# Patient Record
Sex: FEMALE | Race: WHITE | NOT HISPANIC OR LATINO | Employment: OTHER | URBAN - METROPOLITAN AREA
[De-identification: names, ages, dates, MRNs, and addresses within clinical notes are randomized per-mention and may not be internally consistent; named-entity substitution may affect disease eponyms.]

---

## 2017-03-07 ENCOUNTER — HOSPITAL ENCOUNTER (OUTPATIENT)
Dept: ORTHOPEDIC SURGERY | Facility: CLINIC | Age: 67
Discharge: HOME | End: 2017-03-07
Attending: PODIATRIST | Admitting: PODIATRIST

## 2017-07-18 ENCOUNTER — HOSPITAL ENCOUNTER (OUTPATIENT)
Dept: ORTHOPEDIC SURGERY | Facility: CLINIC | Age: 67
Discharge: HOME | End: 2017-07-18
Attending: PODIATRIST | Admitting: PODIATRIST

## 2019-06-16 RX ORDER — SIMVASTATIN 40 MG
TABLET ORAL
Qty: 90 TABLET | Refills: 1 | Status: SHIPPED | OUTPATIENT
Start: 2019-06-16 | End: 2020-01-07

## 2019-07-11 LAB
25(OH)D3+25(OH)D2 SERPL-MCNC: 36.7 NG/ML (ref 30–100)
ALBUMIN SERPL-MCNC: 3.9 G/DL (ref 3.5–4.8)
ALBUMIN/GLOB SERPL: 1.6 {RATIO} (ref 1.2–2.2)
ALP SERPL-CCNC: 109 IU/L (ref 39–117)
ALT SERPL-CCNC: 13 IU/L (ref 0–32)
AMBIG ABBREV CMP14 DEFAULT: NORMAL
AMBIG ABBREV LP DEFAULT: NORMAL
AST SERPL-CCNC: 13 IU/L (ref 0–40)
BILIRUB SERPL-MCNC: 0.3 MG/DL (ref 0–1.2)
BUN SERPL-MCNC: 27 MG/DL (ref 8–27)
BUN/CREAT SERPL: 21 (ref 12–28)
CALCIUM SERPL-MCNC: 9.2 MG/DL (ref 8.7–10.3)
CHLORIDE SERPL-SCNC: 103 MMOL/L (ref 96–106)
CHOLEST SERPL-MCNC: 140 MG/DL (ref 100–199)
CO2 SERPL-SCNC: 24 MMOL/L (ref 20–29)
CREAT SERPL-MCNC: 1.31 MG/DL (ref 0.57–1)
GLOBULIN SER CALC-MCNC: 2.5 G/DL (ref 1.5–4.5)
GLUCOSE SERPL-MCNC: 185 MG/DL (ref 65–99)
HBA1C MFR BLD: 7.9 % (ref 4.8–5.6)
HDLC SERPL-MCNC: 34 MG/DL
LDLC SERPL CALC-MCNC: 80 MG/DL (ref 0–99)
POTASSIUM SERPL-SCNC: 5.1 MMOL/L (ref 3.5–5.2)
PROT SERPL-MCNC: 6.4 G/DL (ref 6–8.5)
SODIUM SERPL-SCNC: 140 MMOL/L (ref 134–144)
TRIGL SERPL-MCNC: 131 MG/DL (ref 0–149)
VLDLC SERPL CALC-MCNC: 26 MG/DL (ref 5–40)

## 2019-07-16 ENCOUNTER — OFFICE VISIT (OUTPATIENT)
Dept: FAMILY MEDICINE CLINIC | Facility: CLINIC | Age: 69
End: 2019-07-16

## 2019-07-16 VITALS
WEIGHT: 251 LBS | HEIGHT: 66 IN | BODY MASS INDEX: 40.34 KG/M2 | HEART RATE: 56 BPM | SYSTOLIC BLOOD PRESSURE: 152 MMHG | OXYGEN SATURATION: 98 % | TEMPERATURE: 98 F | DIASTOLIC BLOOD PRESSURE: 75 MMHG

## 2019-07-16 DIAGNOSIS — E78.2 MIXED HYPERLIPIDEMIA: ICD-10-CM

## 2019-07-16 DIAGNOSIS — I10 ESSENTIAL HYPERTENSION: Primary | ICD-10-CM

## 2019-07-16 DIAGNOSIS — E55.9 VITAMIN D DEFICIENCY: ICD-10-CM

## 2019-07-16 DIAGNOSIS — E11.29 TYPE 2 DIABETES MELLITUS WITH OTHER DIABETIC KIDNEY COMPLICATION (HCC): ICD-10-CM

## 2019-07-16 PROCEDURE — 99213 OFFICE O/P EST LOW 20 MIN: CPT | Performed by: FAMILY MEDICINE

## 2019-07-16 RX ORDER — LOSARTAN POTASSIUM 50 MG/1
25 TABLET ORAL DAILY
COMMUNITY
Start: 2018-07-17 | End: 2022-02-14

## 2019-07-16 RX ORDER — BUPROPION HYDROCHLORIDE 300 MG/1
1 TABLET ORAL EVERY 24 HOURS
COMMUNITY
Start: 2017-02-21 | End: 2019-09-21 | Stop reason: SDUPTHER

## 2019-07-16 RX ORDER — FUROSEMIDE 20 MG/1
1 TABLET ORAL DAILY
COMMUNITY
Start: 2015-04-28 | End: 2020-05-10 | Stop reason: SDUPTHER

## 2019-07-16 RX ORDER — ERGOCALCIFEROL 1.25 MG/1
1 CAPSULE ORAL
COMMUNITY
Start: 2017-09-23 | End: 2019-08-11 | Stop reason: SDUPTHER

## 2019-07-16 RX ORDER — INSULIN ASPART 100 [IU]/ML
INJECTION, SUSPENSION SUBCUTANEOUS
COMMUNITY
Start: 2019-04-10 | End: 2019-07-26 | Stop reason: SDUPTHER

## 2019-07-16 RX ORDER — NEBIVOLOL 10 MG/1
1 TABLET ORAL DAILY
COMMUNITY
Start: 2016-04-25 | End: 2019-08-11 | Stop reason: SDUPTHER

## 2019-07-16 RX ORDER — SIMVASTATIN 40 MG
1 TABLET ORAL EVERY 24 HOURS
COMMUNITY
Start: 2018-09-12 | End: 2020-10-04

## 2019-07-16 NOTE — PATIENT INSTRUCTIONS
Keep working to lose weight through healthy eating and exercise.  Be compliant with  Your diabetic diet

## 2019-07-16 NOTE — PROGRESS NOTES
Subjective   Tanna Bull is a 68 y.o. female.     Comes in for follow up on bp, chol, dm, vit D  Labs done prior to appt and reviewed with her  Noncompliant with  Diet  A1C has increased  Not checking bs   Sees Dr. Torres in [place]         The following portions of the patient's history were reviewed and updated as appropriate: allergies, current medications, past family history, past medical history, past social history, past surgical history and problem list.  Past Medical History:   Diagnosis Date   • Arthritis    • Depression     Mild    • DM2 (diabetes mellitus, type 2) (CMS/[facility])    • High cholesterol    • HTN (hypertension)    • IDDM (insulin dependent diabetes mellitus) (CMS/[facility])      Past Surgical History:   Procedure Laterality Date   • BUNIONECTOMY     • CATARACT EXTRACTION     • CHOLECYSTECTOMY     • ORIF ANKLE FRACTURE Left 2009     Family History   Problem Relation Age of Onset   • Diabetes Other    • Stroke Other    • Breast cancer Other      Social History     Socioeconomic History   • Marital status: Single     Spouse name: Not on file   • Number of children: Not on file   • Years of education: Not on file   • Highest education level: Not on file   Tobacco Use   • Smoking status: Never Smoker   Substance and Sexual Activity   • Alcohol use: Yes     Frequency: Never   • Drug use: No         Current Outpatient Medications:   •  buPROPion XL (WELLBUTRIN XL) 300 MG 24 hr tablet, Take 1 tablet by mouth Daily., Disp: , Rfl:   •  furosemide (LASIX) 20 MG tablet, Take 1 tablet by mouth Daily., Disp: , Rfl:   •  Glucosamine-Chondroit-Collagen ([employer] GLUCO-CHONDROIT PLUS UC-II) 125-100-10 MG tablet, [employer] GLUCO-CHONDROIT PLUS UC--100-10 MG TABS, Disp: , Rfl:   •  glucose blood (ONE TOUCH ULTRA TEST) test strip, ONETOUCH ULTRA BLUE STRP, Disp: , Rfl:   •  insulin aspart protamine & aspart (NOVOLOG) 70/30 100 unit/mL, NOVOLOG MIX 70/30 FLEXPEN (70-30) 100 UNIT/ML SUPN, Disp: , Rfl:   •  Insulin Pen Needle  "(COMFORT EZ PEN NEEDLES) 31G X 5 MM misc, COMFORT EZ PEN NEEDLES 31G X 5 MM, Disp: , Rfl:   •  losartan (COZAAR) 50 MG tablet, Take 1 tablet by mouth Daily., Disp: , Rfl:   •  nebivolol (BYSTOLIC) 10 MG tablet, Take 1 tablet by mouth Daily., Disp: , Rfl:   •  simvastatin (ZOCOR) 40 MG tablet, Take 1 tablet by mouth Daily., Disp: , Rfl:   •  vitamin D (ERGOCALCIFEROL) 68670 units capsule capsule, Take 1 capsule by mouth Every 7 (Seven) Days., Disp: , Rfl:   •  NOVOLOG MIX 70/30 FLEXPEN (70-30) 100 UNIT/ML suspension pen-injector injection, , Disp: , Rfl:   •  simvastatin (ZOCOR) 40 MG tablet, TAKE 1 TABLET DAILY, Disp: 90 tablet, Rfl: 1    Review of Systems   Constitutional: Negative for diaphoresis, fatigue, fever, unexpected weight gain and unexpected weight loss.   Respiratory: Negative for cough, chest tightness and shortness of breath.    Cardiovascular: Negative for chest pain, palpitations and leg swelling.   Gastrointestinal: Negative for nausea and vomiting.   Endocrine: Negative for cold intolerance, heat intolerance, polydipsia, polyphagia and polyuria.   Neurological: Negative for dizziness, syncope and headache.     /75 (BP Location: Right arm, Patient Position: Sitting, Cuff Size: Adult)   Pulse 56   Temp 98 °F (36.7 °C) (Oral)   Ht 167.6 cm (66\")   Wt 114 kg (251 lb)   SpO2 98%   BMI 40.51 kg/m²       Objective   Physical Exam   Constitutional: She appears well-developed and well-nourished. No distress. She is morbidly obese.  HENT:   Head: Normocephalic and atraumatic.   Neck: Neck supple. No JVD present. No thyromegaly present.   Cardiovascular: Normal rate, regular rhythm, normal heart sounds and intact distal pulses. Exam reveals no gallop and no friction rub.   No murmur heard.  Pulmonary/Chest: Effort normal and breath sounds normal. No respiratory distress. She has no wheezes. She has no rales.   Musculoskeletal: She exhibits no edema.   Lymphadenopathy:     She has no cervical " adenopathy.   Neurological: She is alert.   Skin: Skin is warm and dry.         Assessment/Plan   Problems Addressed this Visit        Cardiovascular and Mediastinum    Hyperlipidemia    Relevant Medications    simvastatin (ZOCOR) 40 MG tablet    Hypertension - Primary    Relevant Medications    furosemide (LASIX) 20 MG tablet    losartan (COZAAR) 50 MG tablet    nebivolol (BYSTOLIC) 10 MG tablet       Digestive    Vitamin D deficiency       Endocrine    Type 2 diabetes mellitus with other diabetic kidney complication (CMS/HCC)    Relevant Medications    furosemide (LASIX) 20 MG tablet    insulin aspart protamine & aspart (NOVOLOG) 70/30 100 unit/mL    NOVOLOG MIX 70/30 FLEXPEN (70-30) 100 UNIT/ML suspension pen-injector injection

## 2019-07-17 NOTE — PROGRESS NOTES
Subjective   Tanna Bull is a 68 y.o. female.     History of Present Illness     The following portions of the patient's history were reviewed and updated as appropriate: allergies, current medications, past family history, past medical history, past social history, past surgical history and problem list.  Past Medical History:   Diagnosis Date   • Arthritis    • Depression     Mild    • DM2 (diabetes mellitus, type 2) (CMS/[facility])    • High cholesterol    • HTN (hypertension)    • IDDM (insulin dependent diabetes mellitus) (CMS/[facility])      Past Surgical History:   Procedure Laterality Date   • BUNIONECTOMY     • CATARACT EXTRACTION     • CHOLECYSTECTOMY     • ORIF ANKLE FRACTURE Left 2009     Family History   Problem Relation Age of Onset   • Diabetes Other    • Stroke Other    • Breast cancer Other      Social History     Socioeconomic History   • Marital status: Single     Spouse name: Not on file   • Number of children: Not on file   • Years of education: Not on file   • Highest education level: Not on file   Tobacco Use   • Smoking status: Never Smoker   Substance and Sexual Activity   • Alcohol use: Yes     Frequency: Never   • Drug use: No         Current Outpatient Medications:   •  buPROPion XL (WELLBUTRIN XL) 300 MG 24 hr tablet, Take 1 tablet by mouth Daily., Disp: , Rfl:   •  furosemide (LASIX) 20 MG tablet, Take 1 tablet by mouth Daily., Disp: , Rfl:   •  Glucosamine-Chondroit-Collagen ([employer]S GLUCO-CHONDROIT PLUS UC-II) 125-100-10 MG tablet, [employer] GLUCO-CHONDROIT PLUS UC--100-10 MG TABS, Disp: , Rfl:   •  glucose blood (ONE TOUCH ULTRA TEST) test strip, ONETOUCH ULTRA BLUE STRP, Disp: , Rfl:   •  insulin aspart protamine & aspart (NOVOLOG) 70/30 100 unit/mL, NOVOLOG MIX 70/30 FLEXPEN (70-30) 100 UNIT/ML SUPN, Disp: , Rfl:   •  Insulin Pen Needle (COMFORT EZ PEN NEEDLES) 31G X 5 MM misc, COMFORT EZ PEN NEEDLES 31G X 5 MM, Disp: , Rfl:   •  losartan (COZAAR) 50 MG tablet, Take 1 tablet by mouth Daily.,  "Disp: , Rfl:   •  nebivolol (BYSTOLIC) 10 MG tablet, Take 1 tablet by mouth Daily., Disp: , Rfl:   •  simvastatin (ZOCOR) 40 MG tablet, Take 1 tablet by mouth Daily., Disp: , Rfl:   •  vitamin D (ERGOCALCIFEROL) 45913 units capsule capsule, Take 1 capsule by mouth Every 7 (Seven) Days., Disp: , Rfl:   •  NOVOLOG MIX 70/30 FLEXPEN (70-30) 100 UNIT/ML suspension pen-injector injection, , Disp: , Rfl:   •  simvastatin (ZOCOR) 40 MG tablet, TAKE 1 TABLET DAILY, Disp: 90 tablet, Rfl: 1    Review of Systems  /75 (BP Location: Right arm, Patient Position: Sitting, Cuff Size: Adult)   Pulse 56   Temp 98 °F (36.7 °C) (Oral)   Ht 167.6 cm (66\")   Wt 114 kg (251 lb)   SpO2 98%   BMI 40.51 kg/m²       Objective   Physical Exam      Assessment/Plan   Problems Addressed this Visit        Cardiovascular and Mediastinum    Hyperlipidemia    Relevant Medications    simvastatin (ZOCOR) 40 MG tablet    Hypertension - Primary    Relevant Medications    furosemide (LASIX) 20 MG tablet    losartan (COZAAR) 50 MG tablet    nebivolol (BYSTOLIC) 10 MG tablet       Digestive    Vitamin D deficiency       Endocrine    Type 2 diabetes mellitus with other diabetic kidney complication (CMS/HCC)    Relevant Medications    furosemide (LASIX) 20 MG tablet    insulin aspart protamine & aspart (NOVOLOG) 70/30 100 unit/mL    NOVOLOG MIX 70/30 FLEXPEN (70-30) 100 UNIT/ML suspension pen-injector injection                 "

## 2019-07-26 RX ORDER — INSULIN ASPART 100 [IU]/ML
INJECTION, SUSPENSION SUBCUTANEOUS
Qty: 90 ML | Refills: 3 | Status: SHIPPED | OUTPATIENT
Start: 2019-07-26 | End: 2020-07-12

## 2019-08-12 RX ORDER — NEBIVOLOL HYDROCHLORIDE 10 MG/1
TABLET ORAL
Qty: 90 TABLET | Refills: 3 | Status: SHIPPED | OUTPATIENT
Start: 2019-08-12 | End: 2020-08-24

## 2019-08-12 RX ORDER — ERGOCALCIFEROL 1.25 MG/1
CAPSULE ORAL
Qty: 12 CAPSULE | Refills: 3 | Status: SHIPPED | OUTPATIENT
Start: 2019-08-12 | End: 2020-07-13

## 2019-09-21 RX ORDER — BUPROPION HYDROCHLORIDE 300 MG/1
TABLET ORAL
Qty: 90 TABLET | Refills: 1 | Status: SHIPPED | OUTPATIENT
Start: 2019-09-21 | End: 2020-04-20

## 2020-01-03 LAB
25(OH)D3+25(OH)D2 SERPL-MCNC: 43.7 NG/ML (ref 30–100)
ALBUMIN SERPL-MCNC: 4.1 G/DL (ref 3.5–4.8)
ALBUMIN/GLOB SERPL: 1.7 {RATIO} (ref 1.2–2.2)
ALP SERPL-CCNC: 110 IU/L (ref 39–117)
ALT SERPL-CCNC: 15 IU/L (ref 0–32)
AMBIG ABBREV CMP14 DEFAULT: NORMAL
AMBIG ABBREV LP DEFAULT: NORMAL
AST SERPL-CCNC: 14 IU/L (ref 0–40)
BILIRUB SERPL-MCNC: 0.4 MG/DL (ref 0–1.2)
BUN SERPL-MCNC: 27 MG/DL (ref 8–27)
BUN/CREAT SERPL: 18 (ref 12–28)
CALCIUM SERPL-MCNC: 9.4 MG/DL (ref 8.7–10.3)
CHLORIDE SERPL-SCNC: 100 MMOL/L (ref 96–106)
CHOLEST SERPL-MCNC: 166 MG/DL (ref 100–199)
CO2 SERPL-SCNC: 25 MMOL/L (ref 20–29)
CREAT SERPL-MCNC: 1.5 MG/DL (ref 0.57–1)
GLOBULIN SER CALC-MCNC: 2.4 G/DL (ref 1.5–4.5)
GLUCOSE SERPL-MCNC: 194 MG/DL (ref 65–99)
HBA1C MFR BLD: 6.9 % (ref 4.8–5.6)
HDLC SERPL-MCNC: 35 MG/DL
LDLC SERPL CALC-MCNC: 103 MG/DL (ref 0–99)
POTASSIUM SERPL-SCNC: 5.3 MMOL/L (ref 3.5–5.2)
PROT SERPL-MCNC: 6.5 G/DL (ref 6–8.5)
SODIUM SERPL-SCNC: 140 MMOL/L (ref 134–144)
TRIGL SERPL-MCNC: 141 MG/DL (ref 0–149)
VLDLC SERPL CALC-MCNC: 28 MG/DL (ref 5–40)

## 2020-01-07 RX ORDER — SIMVASTATIN 40 MG
TABLET ORAL
Qty: 90 TABLET | Refills: 1 | Status: SHIPPED | OUTPATIENT
Start: 2020-01-07 | End: 2020-07-12

## 2020-01-16 ENCOUNTER — OFFICE VISIT (OUTPATIENT)
Dept: FAMILY MEDICINE CLINIC | Facility: CLINIC | Age: 70
End: 2020-01-16

## 2020-01-16 VITALS
BODY MASS INDEX: 40.34 KG/M2 | OXYGEN SATURATION: 96 % | DIASTOLIC BLOOD PRESSURE: 66 MMHG | SYSTOLIC BLOOD PRESSURE: 146 MMHG | HEIGHT: 66 IN | WEIGHT: 251 LBS | HEART RATE: 57 BPM

## 2020-01-16 DIAGNOSIS — E55.9 VITAMIN D DEFICIENCY: ICD-10-CM

## 2020-01-16 DIAGNOSIS — I10 ESSENTIAL HYPERTENSION: Primary | ICD-10-CM

## 2020-01-16 DIAGNOSIS — E11.29 TYPE 2 DIABETES MELLITUS WITH OTHER DIABETIC KIDNEY COMPLICATION (HCC): ICD-10-CM

## 2020-01-16 DIAGNOSIS — E78.2 MIXED HYPERLIPIDEMIA: ICD-10-CM

## 2020-01-16 DIAGNOSIS — Z12.31 BREAST CANCER SCREENING BY MAMMOGRAM: ICD-10-CM

## 2020-01-16 DIAGNOSIS — Z12.11 SCREENING FOR COLON CANCER: ICD-10-CM

## 2020-01-16 PROCEDURE — 99213 OFFICE O/P EST LOW 20 MIN: CPT | Performed by: FAMILY MEDICINE

## 2020-01-16 NOTE — PROGRESS NOTES
Subjective   Tanna Bull is a 69 y.o. female.     She comes in today for follow-up on her blood pressure, cholesterol, type 2 diabetes  Labs were done prior to her appointment  These were reviewed with her  She has had her flu shot  Exercising 3 times a week  BS not checking consistently  Last eye exam - last Feb  Due colonoscopy  Not need refills  She is due mammogram       The following portions of the patient's history were reviewed and updated as appropriate: allergies, current medications, past family history, past medical history, past social history, past surgical history and problem list.  Past Medical History:   Diagnosis Date   • Arthritis    • Depression     Mild    • DM2 (diabetes mellitus, type 2) (CMS/[facility])    • High cholesterol    • HTN (hypertension)    • IDDM (insulin dependent diabetes mellitus) (CMS/[facility])      Past Surgical History:   Procedure Laterality Date   • BUNIONECTOMY     • CATARACT EXTRACTION     • CHOLECYSTECTOMY     • ORIF ANKLE FRACTURE Left 2009     Family History   Problem Relation Age of Onset   • Diabetes Other    • Stroke Other    • Breast cancer Other      Social History     Socioeconomic History   • Marital status: Single     Spouse name: Not on file   • Number of children: Not on file   • Years of education: Not on file   • Highest education level: Not on file   Tobacco Use   • Smoking status: Never Smoker   Substance and Sexual Activity   • Alcohol use: Yes     Frequency: Never   • Drug use: No         Current Outpatient Medications:   •  buPROPion XL (WELLBUTRIN XL) 300 MG 24 hr tablet, TAKE 1 TABLET DAILY, Disp: 90 tablet, Rfl: 1  •  BYSTOLIC 10 MG tablet, TAKE 1 TABLET DAILY, Disp: 90 tablet, Rfl: 3  •  furosemide (LASIX) 20 MG tablet, Take 1 tablet by mouth Daily., Disp: , Rfl:   •  Glucosamine-Chondroit-Collagen ([employer]S GLUCO-CHONDROIT PLUS UC-II) 125-100-10 MG tablet, CVS GLUCO-CHONDROIT PLUS UC--100-10 MG TABS, Disp: , Rfl:   •  glucose blood (ONE TOUCH ULTRA  "TEST) test strip, ONETOUCH ULTRA BLUE STRP, Disp: , Rfl:   •  Insulin Pen Needle (COMFORT EZ PEN NEEDLES) 31G X 5 MM misc, COMFORT EZ PEN NEEDLES 31G X 5 MM, Disp: , Rfl:   •  losartan (COZAAR) 50 MG tablet, Take 1 tablet by mouth Daily., Disp: , Rfl:   •  NOVOLOG MIX 70/30 FLEXPEN (70-30) 100 UNIT/ML suspension pen-injector injection, INJECT 45 UNITS            SUBCUTANEOUSLY TWO TIMES A DAY, Disp: 90 mL, Rfl: 3  •  simvastatin (ZOCOR) 40 MG tablet, Take 1 tablet by mouth Daily., Disp: , Rfl:   •  simvastatin (ZOCOR) 40 MG tablet, TAKE 1 TABLET DAILY, Disp: 90 tablet, Rfl: 1  •  vitamin D (ERGOCALCIFEROL) 90954 units capsule capsule, TAKE 1 CAPSULE WEEKLY, Disp: 12 capsule, Rfl: 3    Review of Systems   Constitutional: Negative for diaphoresis, fatigue, fever, unexpected weight gain and unexpected weight loss.   Respiratory: Negative for cough, chest tightness and shortness of breath.    Cardiovascular: Negative for chest pain, palpitations and leg swelling.   Gastrointestinal: Negative for nausea and vomiting.   Endocrine: Negative.    Neurological: Negative for dizziness, syncope and headache.     /66 (BP Location: Left arm, Patient Position: Sitting, Cuff Size: Large Adult)   Pulse 57   Ht 167.6 cm (66\")   Wt 114 kg (251 lb)   SpO2 96%   BMI 40.51 kg/m²       Objective   Physical Exam   Constitutional: She appears well-developed and well-nourished. No distress. She is morbidly obese.  HENT:   Head: Normocephalic and atraumatic.   Neck: Neck supple. No JVD present. No thyromegaly present.   Cardiovascular: Normal rate, regular rhythm, normal heart sounds and intact distal pulses. Exam reveals no gallop and no friction rub.   No murmur heard.  Pulmonary/Chest: Effort normal and breath sounds normal. No respiratory distress. She has no wheezes. She has no rales.   Musculoskeletal: She exhibits no edema.   Lymphadenopathy:     She has no cervical adenopathy.   Neurological: She is alert.   Skin: Skin is " warm and dry.   Psychiatric: She has a normal mood and affect.   Nursing note and vitals reviewed.        Assessment/Plan   Problems Addressed this Visit        Cardiovascular and Mediastinum    Hyperlipidemia    Hypertension - Primary       Digestive    Vitamin D deficiency       Endocrine    Type 2 diabetes mellitus with other diabetic kidney complication (CMS/HCC)      Other Visit Diagnoses     Screening for colon cancer        Relevant Orders    Ambulatory Referral For Screening Colonoscopy    Breast cancer screening by mammogram        Relevant Orders    Mammo Screening Digital Tomosynthesis Bilateral With CAD          Patient's labs were done prior to her appointment and I reviewed all these with her  Her good cholesterol remains low and she was encouraged to continue working with exercise  There is been improvement in her hemoglobin A1c  She is overdue for an eye exam and she was encouraged to get that scheduled  Counseled on the need for weight loss and dietary compliance  Blood pressure stable  She is due for her colonoscopy and her mammogram and she will get those scheduled  I will see her back in 6 months for her follow-up and her Medicare wellness

## 2020-01-16 NOTE — PATIENT INSTRUCTIONS
See your eye doctor this  Year  Keep working to lose weight through healthy eating and exercise.  No fried foods and limit pasta, bread, and sweets.

## 2020-01-22 ENCOUNTER — HOSPITAL ENCOUNTER (OUTPATIENT)
Facility: HOSPITAL | Age: 70
Setting detail: HOSPITAL OUTPATIENT SURGERY
End: 2020-01-22
Attending: INTERNAL MEDICINE | Admitting: INTERNAL MEDICINE

## 2020-01-28 ENCOUNTER — HOSPITAL ENCOUNTER (OUTPATIENT)
Dept: MAMMOGRAPHY | Facility: HOSPITAL | Age: 70
Discharge: HOME | End: 2020-01-28
Admitting: FAMILY MEDICINE

## 2020-01-28 DIAGNOSIS — Z12.31 BREAST CANCER SCREENING BY MAMMOGRAM: ICD-10-CM

## 2020-01-28 PROCEDURE — 77067 SCR MAMMO BI INCL CAD: CPT

## 2020-01-28 PROCEDURE — 77063 BREAST TOMOSYNTHESIS BI: CPT

## 2020-02-20 ENCOUNTER — APPOINTMENT (OUTPATIENT)
Dept: GENERAL RADIOLOGY | Facility: HOSPITAL | Age: 70
End: 2020-02-20

## 2020-02-20 ENCOUNTER — HOSPITAL ENCOUNTER (EMERGENCY)
Facility: HOSPITAL | Age: 70
Discharge: HOME | End: 2020-02-20
Admitting: EMERGENCY MEDICINE

## 2020-02-20 VITALS
RESPIRATION RATE: 16 BRPM | OXYGEN SATURATION: 99 % | SYSTOLIC BLOOD PRESSURE: 157 MMHG | DIASTOLIC BLOOD PRESSURE: 84 MMHG | BODY MASS INDEX: 40.99 KG/M2 | HEIGHT: 66 IN | TEMPERATURE: 98.4 F | WEIGHT: 255.07 LBS | HEART RATE: 52 BPM

## 2020-02-20 DIAGNOSIS — M79.671 RIGHT FOOT PAIN: Primary | ICD-10-CM

## 2020-02-20 PROCEDURE — 73610 X-RAY EXAM OF ANKLE: CPT

## 2020-02-20 PROCEDURE — 73630 X-RAY EXAM OF FOOT: CPT

## 2020-02-20 PROCEDURE — 99283 EMERGENCY DEPT VISIT LOW MDM: CPT

## 2020-02-20 NOTE — ED PROVIDER NOTES
Subjective   Chief complaint: Foot pain      Context: Patient is a 69-year-old female who comes in [place] by private vehicle with her family member with complaints of right foot pain that started last night around 1030 and again around 4 when she stood up to go to the bathroom.  She denies any injury.  Has a history of osteoarthritis.  Has any fever or systemic illness    Duration: Yesterday    Timing: Waxes and wanes    Severity: Improving    Associated symptoms: Worse with initial weightbearing          PCP: dyana gaona      LNMP: Postmenopausal          Review of Systems   Constitutional: Negative.    HENT: Negative.    Respiratory: Negative.    Cardiovascular: Negative.    Gastrointestinal: Negative.    Musculoskeletal: Positive for myalgias.   Skin: Negative.    Neurological: Negative.    Hematological: Does not bruise/bleed easily.       Past Medical History:   Diagnosis Date   • Arthritis    • Depression     Mild    • DM2 (diabetes mellitus, type 2) (CMS/[facility])    • High cholesterol    • HTN (hypertension)    • IDDM (insulin dependent diabetes mellitus) (CMS/[facility])        Allergies   Allergen Reactions   • Ace Inhibitors Unknown (See Comments)   • Penicillin G Unknown (See Comments)       Past Surgical History:   Procedure Laterality Date   • BREAST BIOPSY Left     core bx (unsure when)   • BUNIONECTOMY     • CATARACT EXTRACTION     • CHOLECYSTECTOMY     • ORIF ANKLE FRACTURE Left 2009       Family History   Problem Relation Age of Onset   • Diabetes Other    • Stroke Other    • Breast cancer Other    • Breast cancer Sister 60       Social History     Socioeconomic History   • Marital status: Single     Spouse name: Not on file   • Number of children: Not on file   • Years of education: Not on file   • Highest education level: Not on file   Tobacco Use   • Smoking status: Never Smoker   Substance and Sexual Activity   • Alcohol use: Yes     Frequency: Never   • Drug use: No           Objective    Physical Exam     Vital signs and traige nurse note reviewed.  Constitutional:  Awake, alert; well developed and well nourished.  No acute distress, the patient is examined in hospital gown.  HEENT:  Normocephalic, atraumatic;  with intact EOM; oropharynx is pink and moist without edema or erythema.  Neck: Supple, full range of motion without pain;   Cardiovascular: Regular rate and rhythm,    Pulmonary: Respiratory effort regular, nonlabored;   Musculoskeletal: Full range of motion of the right foot.  There is some pain over the dorsum of proximal foot to palpation.  Minor mild tenderness over the plantar fascia.  There is no swelling erythema ecchymosis abrasions or recent trauma.  Distant resting sensorimotor intact.  Neuro: Alert oriented x3, speech is clear and appropriate.      Procedures           ED Course      Labs Reviewed - No data to display  Medications - No data to display  Xr Ankle 3+ View Right    Result Date: 2/20/2020  No acute osseous abnormality. Mild lateral soft tissue swelling is present. Irregularity seen along the malleoli likely related to previous ligamentous injury, similar as compared to the previous study.  Electronically Signed By-Cece Holman On:2/20/2020 10:43 AM This report was finalized on [id number] by  Cece Holman, .    Xr Foot 3+ View Right    Result Date: 2/20/2020  No acute osseous abnormality. Mild to moderate osteoarthritic changes are present with evidence of chronic enter fasciitis. No evidence of an inflammatory arthropathy.  Electronically Signed ByRenee Holman On:2/20/2020 10:45 AM This report was finalized on [id number] by  Cece Holman, .                                         MDM  Number of Diagnoses or Management Options  Right foot pain:   Diagnosis management comments: Medical decision  Comorbidities:  has a past medical history of Arthritis, Depression, DM2 (diabetes mellitus, type 2) (CMS/HCC), High cholesterol, HTN (hypertension), and IDDM (insulin  dependent diabetes mellitus) (CMS/[facility]).  Differentials: Stress fracture osteoarthritis plantar fascitis not all inclusive of differentials considered  Radiology interpretation:  X-rays reviewed by me and interpreted by radiologist,   Xr Ankle 3+ View Right    Result Date: 2/20/2020  No acute osseous abnormality. Mild lateral soft tissue swelling is present. Irregularity seen along the malleoli likely related to previous ligamentous injury, similar as compared to the previous study.  Electronically Signed By-Cece Holman On:2/20/2020 10:43 AM This report was finalized on [id number] by  Cece Holman, .    Xr Foot 3+ View Right    Result Date: 2/20/2020  No acute osseous abnormality. Mild to moderate osteoarthritic changes are present with evidence of chronic enter fasciitis. No evidence of an inflammatory arthropathy.  Electronically Signed ByRenee Holman On:2/20/2020 10:45 AM This report was finalized on [id number] by  Cece Holman, .    Patient was placed in a postop shoe  i discussed findings with patient who voices understanding of discharge instructions, signs and symptoms requiring return to ED; discharged improved and in stable condition with follow up for re-evaluation.        Final diagnoses:   Right foot pain            Mendy Velez, APRN  02/20/20 1103

## 2020-04-20 RX ORDER — BUPROPION HYDROCHLORIDE 300 MG/1
TABLET ORAL
Qty: 90 TABLET | Refills: 1 | Status: SHIPPED | OUTPATIENT
Start: 2020-04-20 | End: 2020-10-04

## 2020-05-10 RX ORDER — FUROSEMIDE 20 MG/1
20 TABLET ORAL DAILY
Qty: 90 TABLET | Refills: 0 | Status: SHIPPED | OUTPATIENT
Start: 2020-05-10 | End: 2020-12-26

## 2020-06-30 ENCOUNTER — TELEPHONE (OUTPATIENT)
Dept: FAMILY MEDICINE CLINIC | Facility: CLINIC | Age: 70
End: 2020-06-30

## 2020-06-30 DIAGNOSIS — E11.29 TYPE 2 DIABETES MELLITUS WITH OTHER DIABETIC KIDNEY COMPLICATION (HCC): ICD-10-CM

## 2020-06-30 DIAGNOSIS — E78.2 MIXED HYPERLIPIDEMIA: Primary | ICD-10-CM

## 2020-06-30 DIAGNOSIS — Z11.59 NEED FOR HEPATITIS C SCREENING TEST: ICD-10-CM

## 2020-06-30 DIAGNOSIS — I10 ESSENTIAL HYPERTENSION: ICD-10-CM

## 2020-06-30 DIAGNOSIS — E55.9 VITAMIN D DEFICIENCY: ICD-10-CM

## 2020-07-08 LAB
25(OH)D3+25(OH)D2 SERPL-MCNC: 45.2 NG/ML (ref 30–100)
ALBUMIN SERPL-MCNC: 4 G/DL (ref 3.7–4.7)
ALBUMIN/GLOB SERPL: 1.5 {RATIO} (ref 1.2–2.2)
ALP SERPL-CCNC: 114 IU/L (ref 39–117)
ALT SERPL-CCNC: 14 IU/L (ref 0–32)
AMBIG ABBREV CMP14 DEFAULT: NORMAL
AMBIG ABBREV LP DEFAULT: NORMAL
AST SERPL-CCNC: 16 IU/L (ref 0–40)
BILIRUB SERPL-MCNC: 0.3 MG/DL (ref 0–1.2)
BUN SERPL-MCNC: 24 MG/DL (ref 8–27)
BUN/CREAT SERPL: 18 (ref 12–28)
CALCIUM SERPL-MCNC: 9.3 MG/DL (ref 8.7–10.3)
CHLORIDE SERPL-SCNC: 102 MMOL/L (ref 96–106)
CHOLEST SERPL-MCNC: 149 MG/DL (ref 100–199)
CO2 SERPL-SCNC: 22 MMOL/L (ref 20–29)
CREAT SERPL-MCNC: 1.35 MG/DL (ref 0.57–1)
GLOBULIN SER CALC-MCNC: 2.7 G/DL (ref 1.5–4.5)
GLUCOSE SERPL-MCNC: 165 MG/DL (ref 65–99)
HBA1C MFR BLD: 7.6 % (ref 4.8–5.6)
HCV AB S/CO SERPL IA: <0.1 S/CO RATIO (ref 0–0.9)
HDLC SERPL-MCNC: 38 MG/DL
LDLC SERPL CALC-MCNC: 73 MG/DL (ref 0–99)
POTASSIUM SERPL-SCNC: 4.8 MMOL/L (ref 3.5–5.2)
PROT SERPL-MCNC: 6.7 G/DL (ref 6–8.5)
SODIUM SERPL-SCNC: 141 MMOL/L (ref 134–144)
TRIGL SERPL-MCNC: 189 MG/DL (ref 0–149)
VLDLC SERPL CALC-MCNC: 38 MG/DL (ref 5–40)

## 2020-07-12 RX ORDER — INSULIN ASPART 100 [IU]/ML
INJECTION, SUSPENSION SUBCUTANEOUS
Qty: 90 ML | Refills: 0 | Status: SHIPPED | OUTPATIENT
Start: 2020-07-12 | End: 2020-10-24

## 2020-07-12 RX ORDER — SIMVASTATIN 40 MG
TABLET ORAL
Qty: 90 TABLET | Refills: 0 | Status: SHIPPED | OUTPATIENT
Start: 2020-07-12 | End: 2020-07-21

## 2020-07-13 RX ORDER — ERGOCALCIFEROL 1.25 MG/1
CAPSULE ORAL
Qty: 12 CAPSULE | Refills: 3 | Status: SHIPPED | OUTPATIENT
Start: 2020-07-13 | End: 2021-07-04

## 2020-07-21 ENCOUNTER — LAB (OUTPATIENT)
Dept: FAMILY MEDICINE CLINIC | Facility: CLINIC | Age: 70
End: 2020-07-21

## 2020-07-21 ENCOUNTER — OFFICE VISIT (OUTPATIENT)
Dept: FAMILY MEDICINE CLINIC | Facility: CLINIC | Age: 70
End: 2020-07-21

## 2020-07-21 VITALS
HEART RATE: 52 BPM | BODY MASS INDEX: 40.66 KG/M2 | TEMPERATURE: 97.1 F | DIASTOLIC BLOOD PRESSURE: 75 MMHG | OXYGEN SATURATION: 97 % | HEIGHT: 66 IN | WEIGHT: 253 LBS | SYSTOLIC BLOOD PRESSURE: 144 MMHG

## 2020-07-21 DIAGNOSIS — L98.9 SKIN LESION OF FACE: ICD-10-CM

## 2020-07-21 DIAGNOSIS — E78.2 MIXED HYPERLIPIDEMIA: ICD-10-CM

## 2020-07-21 DIAGNOSIS — E11.29 TYPE 2 DIABETES MELLITUS WITH OTHER DIABETIC KIDNEY COMPLICATION (HCC): Primary | ICD-10-CM

## 2020-07-21 DIAGNOSIS — I10 ESSENTIAL HYPERTENSION: ICD-10-CM

## 2020-07-21 DIAGNOSIS — E55.9 VITAMIN D DEFICIENCY: ICD-10-CM

## 2020-07-21 PROCEDURE — 99213 OFFICE O/P EST LOW 20 MIN: CPT | Performed by: FAMILY MEDICINE

## 2020-07-21 NOTE — PATIENT INSTRUCTIONS
Keep working to lose weight through healthy eating and exercise.  Get a flu shot this Fall  No fried foods and limit pasta, bread, and sweets.

## 2020-07-21 NOTE — PROGRESS NOTES
Subjective   Tanna Bull is a 69 y.o. female.     She comes in for follow up on bp,chol, and dm  BS have increased a little and A1C has risen  Wearing a mask but going out into the community despite current pandemic  Feels well   Does not need refillsl         The following portions of the patient's history were reviewed and updated as appropriate: allergies, current medications, past family history, past medical history, past social history, past surgical history and problem list.  Past Medical History:   Diagnosis Date   • Arthritis    • Depression     Mild    • DM2 (diabetes mellitus, type 2) (CMS/[facility])    • High cholesterol    • HTN (hypertension)    • IDDM (insulin dependent diabetes mellitus)      Past Surgical History:   Procedure Laterality Date   • BREAST BIOPSY Left     core bx (unsure when)   • BUNIONECTOMY     • CATARACT EXTRACTION     • CHOLECYSTECTOMY     • ORIF ANKLE FRACTURE Left 2009     Family History   Problem Relation Age of Onset   • Diabetes Other    • Stroke Other    • Breast cancer Other    • Breast cancer Sister 60     Social History     Socioeconomic History   • Marital status: Single     Spouse name: Not on file   • Number of children: Not on file   • Years of education: Not on file   • Highest education level: Not on file   Tobacco Use   • Smoking status: Never Smoker   • Smokeless tobacco: Never Used   Substance and Sexual Activity   • Alcohol use: Yes     Frequency: Never   • Drug use: No         Current Outpatient Medications:   •  buPROPion XL (WELLBUTRIN XL) 300 MG 24 hr tablet, TAKE 1 TABLET DAILY, Disp: 90 tablet, Rfl: 1  •  BYSTOLIC 10 MG tablet, TAKE 1 TABLET DAILY, Disp: 90 tablet, Rfl: 3  •  furosemide (LASIX) 20 MG tablet, Take 1 tablet by mouth Daily., Disp: 90 tablet, Rfl: 0  •  Glucosamine-Chondroit-Collagen ([employer] GLUCO-CHONDROIT PLUS UC-II) 125-100-10 MG tablet, [employer] GLUCO-CHONDROIT PLUS UC--100-10 MG TABS, Disp: , Rfl:   •  glucose blood (ONE TOUCH ULTRA TEST) test  "strip, ONETOUCH ULTRA BLUE STRP, Disp: , Rfl:   •  Insulin Pen Needle (COMFORT EZ PEN NEEDLES) 31G X 5 MM misc, COMFORT EZ PEN NEEDLES 31G X 5 MM, Disp: , Rfl:   •  losartan (COZAAR) 50 MG tablet, Take 1 tablet by mouth Daily., Disp: , Rfl:   •  NOVOLOG MIX 70/30 FLEXPEN (70-30) 100 UNIT/ML suspension pen-injector injection, INJECT 45 UNITS            SUBCUTANEOUSLY TWO TIMES A DAY (Patient taking differently: Inject 50 Units under the skin into the appropriate area as directed 2 (two) times a day.), Disp: 90 mL, Rfl: 0  •  simvastatin (ZOCOR) 40 MG tablet, Take 1 tablet by mouth Daily., Disp: , Rfl:   •  vitamin D (ERGOCALCIFEROL) 1.25 MG (13634 UT) capsule capsule, TAKE 1 CAPSULE WEEKLY, Disp: 12 capsule, Rfl: 3    Review of Systems   Constitutional: Negative for diaphoresis, fatigue, fever, unexpected weight gain and unexpected weight loss.   Respiratory: Negative for cough, chest tightness and shortness of breath.    Cardiovascular: Negative for chest pain, palpitations and leg swelling.   Gastrointestinal: Negative for nausea and vomiting.   Endocrine: Negative.    Neurological: Negative for dizziness, syncope, numbness and headache.     /75 (BP Location: Left arm, Patient Position: Sitting, Cuff Size: Large Adult)   Pulse 52   Temp 97.1 °F (36.2 °C) (Temporal)   Ht 167.6 cm (66\")   Wt 115 kg (253 lb)   SpO2 97%   Breastfeeding No   BMI 40.84 kg/m²       Objective   Physical Exam   Constitutional: She appears well-developed and well-nourished. No distress. She is morbidly obese.  HENT:   Head: Normocephalic and atraumatic.   Neck: Neck supple. No JVD present. No thyromegaly present.   Cardiovascular: Normal rate, regular rhythm, normal heart sounds and intact distal pulses. Exam reveals no gallop and no friction rub.   No murmur heard.  Pulmonary/Chest: Effort normal and breath sounds normal. No respiratory distress. She has no wheezes. She has no rales.   Musculoskeletal: She exhibits no edema.    " Tanna had a diabetic foot exam performed today.   During the foot exam she had a monofilament test not performed.  Vascular Status -  Her right foot exhibits normal foot vasculature  and no edema. Her left foot exhibits normal foot vasculature  and no edema.  Skin Integrity  -  Her right foot skin is intact. She has right foot onychomycosis and callous right foot.  She has no right foot ulcer, no ingrown toenail on right foot, right heel is not dry and cracked, no right foot warmth, no right foot blister and no right foot gangrenous changes.Her left foot skin is intact.She has left foot onychomycosis and callous left foot. She has no left foot ulcer, no left ingrown toenail, no left heel dry and cracked, no left foot warmth, no left foot blister and no left foot gangrenous changes..   Foot Structure and Biomechanics -  Her right foot has no hallux valgus, no pes cavus, no claw toes and no hammer toes present. Her left foot has no hallux valgus, no pes cavus, no claw toes and no hammer toes.  Lymphadenopathy:     She has no cervical adenopathy.   Neurological: She is alert.   Skin: Skin is warm and dry. Turgor is normal.   Pearly raised lesion on left side of face ant to ear  1.5cm in size; irritated central appearance    Psychiatric: She has a normal mood and affect.   Nursing note and vitals reviewed.    Lab Results   Component Value Date    BUN 24 07/07/2020    CREATININE 1.35 (H) 07/07/2020    EGFRIFNONA 40 (L) 07/07/2020    EGFRIFAFRI 46 (L) 07/07/2020    BCR 18 07/07/2020    K 4.8 07/07/2020    CO2 22 07/07/2020    CALCIUM 9.3 07/07/2020    PROTENTOTREF 6.7 07/07/2020    ALBUMIN 4.0 07/07/2020    LABIL2 1.5 07/07/2020    AST 16 07/07/2020    ALT 14 07/07/2020     Lab Results   Component Value Date    CHLPL 149 07/07/2020    TRIG 189 (H) 07/07/2020    HDL 38 (L) 07/07/2020    LDL 73 07/07/2020     Lab Results   Component Value Date    HGBA1C 7.6 (H) 07/07/2020         Assessment/Plan   Problems Addressed this  Visit        Cardiovascular and Mediastinum    Hyperlipidemia    Relevant Orders    Comprehensive Metabolic Panel    Lipid Panel    Hypertension    Relevant Orders    Comprehensive Metabolic Panel    Lipid Panel       Digestive    Vitamin D deficiency    Relevant Orders    Vitamin D 25 Hydroxy       Endocrine    Type 2 diabetes mellitus with other diabetic kidney complication (CMS/HCC) - Primary    Relevant Orders    Comprehensive Metabolic Panel    Hemoglobin A1c    Lipid Panel      Other Visit Diagnoses     Skin lesion of face        Relevant Orders    Ambulatory Referral to Dermatology        counseled on the need for weight loss and dietary compliance  Labs reviewed  She will get her flu shot this fall  She had a pretty significant reaction to her Prevnar 13 and is resistant to getting a Pneumovax  Will refer to dermatology for evaluation of skin lesion on her face as I am concerned that it is a basal cell carcinoma  I will see her back in 6 months

## 2020-08-24 ENCOUNTER — TELEPHONE (OUTPATIENT)
Dept: FAMILY MEDICINE CLINIC | Facility: CLINIC | Age: 70
End: 2020-08-24

## 2020-08-24 RX ORDER — NEBIVOLOL HYDROCHLORIDE 10 MG/1
TABLET ORAL
Qty: 90 TABLET | Refills: 3 | Status: SHIPPED | OUTPATIENT
Start: 2020-08-24 | End: 2021-08-15

## 2020-08-24 NOTE — TELEPHONE ENCOUNTER
Pt is to have a colonoscopy on 9/1/20.  She is asking what she should do with her insulin on the day that she can only drink have liquids?

## 2020-08-29 ENCOUNTER — LAB (OUTPATIENT)
Dept: LAB | Facility: HOSPITAL | Age: 70
End: 2020-08-29

## 2020-08-29 PROCEDURE — U0002 COVID-19 LAB TEST NON-CDC: HCPCS

## 2020-08-29 PROCEDURE — U0004 COV-19 TEST NON-CDC HGH THRU: HCPCS

## 2020-08-29 PROCEDURE — C9803 HOPD COVID-19 SPEC COLLECT: HCPCS

## 2020-08-31 ENCOUNTER — ANESTHESIA EVENT (OUTPATIENT)
Dept: GASTROENTEROLOGY | Facility: HOSPITAL | Age: 70
End: 2020-08-31

## 2020-08-31 LAB
REF LAB TEST METHOD: NORMAL
SARS-COV-2 RNA RESP QL NAA+PROBE: NOT DETECTED

## 2020-09-01 ENCOUNTER — ON CAMPUS - OUTPATIENT (OUTPATIENT)
Dept: URBAN - METROPOLITAN AREA HOSPITAL 85 | Facility: HOSPITAL | Age: 70
End: 2020-09-01
Payer: COMMERCIAL

## 2020-09-01 ENCOUNTER — HOSPITAL ENCOUNTER (OUTPATIENT)
Facility: HOSPITAL | Age: 70
Setting detail: HOSPITAL OUTPATIENT SURGERY
Discharge: HOME | End: 2020-09-01
Attending: INTERNAL MEDICINE | Admitting: INTERNAL MEDICINE

## 2020-09-01 ENCOUNTER — ANESTHESIA (OUTPATIENT)
Dept: GASTROENTEROLOGY | Facility: HOSPITAL | Age: 70
End: 2020-09-01

## 2020-09-01 VITALS
OXYGEN SATURATION: 98 % | DIASTOLIC BLOOD PRESSURE: 61 MMHG | HEIGHT: 66 IN | SYSTOLIC BLOOD PRESSURE: 130 MMHG | TEMPERATURE: 99 F | WEIGHT: 254.19 LBS | BODY MASS INDEX: 40.85 KG/M2 | HEART RATE: 55 BPM | RESPIRATION RATE: 16 BRPM

## 2020-09-01 DIAGNOSIS — D12.2 BENIGN NEOPLASM OF ASCENDING COLON: ICD-10-CM

## 2020-09-01 DIAGNOSIS — K64.1 SECOND DEGREE HEMORRHOIDS: ICD-10-CM

## 2020-09-01 DIAGNOSIS — K57.30 DIVERTICULOSIS OF LARGE INTESTINE WITHOUT PERFORATION OR ABS: ICD-10-CM

## 2020-09-01 DIAGNOSIS — Z86.010 PERSONAL HISTORY OF COLONIC POLYPS: ICD-10-CM

## 2020-09-01 LAB — GLUCOSE BLDC GLUCOMTR-MCNC: 304 MG/DL (ref 70–105)

## 2020-09-01 PROCEDURE — 88305 TISSUE EXAM BY PATHOLOGIST: CPT | Performed by: INTERNAL MEDICINE

## 2020-09-01 PROCEDURE — 45385 COLONOSCOPY W/LESION REMOVAL: CPT | Mod: 33 | Performed by: INTERNAL MEDICINE

## 2020-09-01 PROCEDURE — 82962 GLUCOSE BLOOD TEST: CPT

## 2020-09-01 PROCEDURE — 45380 COLONOSCOPY AND BIOPSY: CPT | Mod: 33,59 | Performed by: INTERNAL MEDICINE

## 2020-09-01 PROCEDURE — 25010000002 PROPOFOL 10 MG/ML EMULSION: Performed by: ANESTHESIOLOGY

## 2020-09-01 RX ORDER — SODIUM CHLORIDE 0.9 % (FLUSH) 0.9 %
10 SYRINGE (ML) INJECTION EVERY 12 HOURS SCHEDULED
Status: DISCONTINUED | OUTPATIENT
Start: 2020-09-01 | End: 2020-09-01 | Stop reason: HOSPADM

## 2020-09-01 RX ORDER — SODIUM CHLORIDE 0.9 % (FLUSH) 0.9 %
10 SYRINGE (ML) INJECTION AS NEEDED
Status: DISCONTINUED | OUTPATIENT
Start: 2020-09-01 | End: 2020-09-01 | Stop reason: HOSPADM

## 2020-09-01 RX ORDER — PROPOFOL 10 MG/ML
VIAL (ML) INTRAVENOUS AS NEEDED
Status: DISCONTINUED | OUTPATIENT
Start: 2020-09-01 | End: 2020-09-01 | Stop reason: SURG

## 2020-09-01 RX ORDER — SODIUM CHLORIDE 9 MG/ML
9 INJECTION, SOLUTION INTRAVENOUS CONTINUOUS PRN
Status: DISCONTINUED | OUTPATIENT
Start: 2020-09-01 | End: 2020-09-01 | Stop reason: HOSPADM

## 2020-09-01 RX ORDER — SODIUM CHLORIDE 0.9 % (FLUSH) 0.9 %
3 SYRINGE (ML) INJECTION EVERY 12 HOURS SCHEDULED
Status: DISCONTINUED | OUTPATIENT
Start: 2020-09-01 | End: 2020-09-01 | Stop reason: HOSPADM

## 2020-09-01 RX ORDER — SODIUM CHLORIDE 9 MG/ML
30 INJECTION, SOLUTION INTRAVENOUS CONTINUOUS PRN
Status: DISCONTINUED | OUTPATIENT
Start: 2020-09-01 | End: 2020-09-01 | Stop reason: HOSPADM

## 2020-09-01 RX ADMIN — PROPOFOL 350 MG: 10 INJECTION, EMULSION INTRAVENOUS at 08:44

## 2020-09-01 RX ADMIN — SODIUM CHLORIDE 9 ML/HR: 900 INJECTION, SOLUTION INTRAVENOUS at 08:32

## 2020-09-01 RX ADMIN — Medication 10 ML: at 08:33

## 2020-09-01 NOTE — H&P
" LOS: 0 days   Patient Care Team:  Karina Arvizu MD as PCP - General (Family Medicine)      Subjective     Interval History:     Subjective: Personal history of colon polyps    ROS:   No chest pain, shortness of breath, or cough.  No melena or hematochezia         Medication Review:     Current Facility-Administered Medications:   •  sodium chloride 0.9 % flush 10 mL, 10 mL, Intravenous, Q12H, Jewel, Bernadine, CRNA, 10 mL at 09/01/20 0833  •  sodium chloride 0.9 % flush 10 mL, 10 mL, Intravenous, PRN, Jewel, Bernadine, CRNA  •  sodium chloride 0.9 % infusion, 9 mL/hr, Intravenous, Continuous PRN, Jewel, Bernadine, CRNA, Last Rate: 9 mL/hr at 09/01/20 0832, 9 mL/hr at 09/01/20 0832      Objective     Vital Signs  Vitals:    08/24/20 1230 09/01/20 0825   BP:  151/58   BP Location:  Left arm   Patient Position:  Lying   Pulse:  57   Resp:  14   Temp:  99 °F (37.2 °C)   TempSrc:  Oral   SpO2:  96%   Weight: 113 kg (250 lb) 115 kg (254 lb 3.1 oz)   Height: 167.6 cm (66\") 167.6 cm (65.98\")       Physical Exam:    General Appearance:    Awake and alert, in no acute distress   Head:    Normocephalic, without obvious abnormality   Eyes:          Conjunctivae normal, anicteric sclera   Throat:   No oral lesions, no thrush, oral mucosa moist   Neck:   No adenopathy, supple, no JVD   CV/Lungs:     RRR, respirations regular, even and unlabored   Abdomen:     Soft, non-tender, no rebound or guarding, non-distended, no hepatosplenomegaly   Rectal:     Deferred   Extremities:   No edema, no cyanosis   Skin:   No bruising or rash, no jaundice        Results Review:    Lab Results (last 24 hours)     Procedure Component Value Units Date/Time    POC Glucose Once [[id number]]  (Abnormal) Collected:  09/01/20 0814    Specimen:  Blood Updated:  09/01/20 0817     Glucose 304 mg/dL      Comment: Serial Number: [id number]:  [id number]             Imaging Results (Last 24 Hours)     ** No results found for the last 24 hours. **    "         Assessment/Plan   Proceed with scope and MAC anesthesia    Proceed with colonoscopy for surveillance of colon polyps    Jani Montoya MD  09/01/20  08:39

## 2020-09-01 NOTE — ANESTHESIA PREPROCEDURE EVALUATION
Anesthesia Evaluation     Patient summary reviewed and Nursing notes reviewed   no history of anesthetic complications:  NPO Solid Status: > 8 hours  NPO Liquid Status: > 8 hours           Airway   Mallampati: II  TM distance: >3 FB  Neck ROM: full  No difficulty expected  Dental      Pulmonary - negative pulmonary ROS and normal exam   Cardiovascular - normal exam    (+) hypertension, hyperlipidemia,       Neuro/Psych  (+) psychiatric history Depression,     GI/Hepatic/Renal/Endo    (+) obesity,   renal disease CRI, diabetes mellitus type 2,     Musculoskeletal     Abdominal   (+) obese,    Substance History - negative use     OB/GYN negative ob/gyn ROS         Other   arthritis,                      Anesthesia Plan    ASA 3     MAC     intravenous induction     Anesthetic plan, all risks, benefits, and alternatives have been provided, discussed and informed consent has been obtained with: patient.

## 2020-09-01 NOTE — OP NOTE
COLONOSCOPY  Procedure Report    Patient Name:  Tanna Bull  YOB: 1950    Date of Surgery:  9/1/2020     Indications: Personal history of colon polyps    Pre-op Diagnosis:   Personal history of colonic polyps [Z86.010]         Post-op Diagnosis:  Ascending colon polyp x4  Moderately severe diverticulosis of the sigmoid colon  Grade 2 internal hemorrhoids      Procedure(s):  COLONOSCOPY WITH POLYPECTOMY X4    Staff:  Surgeon(s):  Jani Montoya MD         Anesthesia: Monitored Anesthesia Care    Estimated Blood Loss: None  Implants:    Nothing was implanted during the procedure    Specimen:          4 ascending colon polyps    Complications:  None    Description of Procedure:  Informed consent was obtained from the patient.  They were placed in the left lateral decubitus position and IV conscious sedation was administered by anesthesia while being monitored continuously throughout the procedure.  Digital rectal exam revealed no external hemorrhoids fissure or fistula.  Digital exam of the anal canal rectal vault was unremarkable.  The video Olympus colonoscope was introduced into the rectum and was advanced to the cecum with ileocecal valve and appendiceal orifice were identified and photographed.  The prep was excellent.  On slow withdrawal close circumferential colonic mucosal inspection revealed 4 ascending colon polyps.  3 were about 3 to 4 mm and were cold biopsy removed.  One was approximately 6 to 7 mm was cold snared and removed.  No ischemic vascular inflammatory lesions are seen.  Moderately severe diverticulosis with hypertrophy of the folds and narrowing of the lumen is noted in the sigmoid colon.  Grade 2 internal hemorrhoids were found on retroflexion.  The scope was removed she tolerated the procedure well returned to recovery in good condition.    Impression:  Colon polyps  Diverticulosis    Recommendations:  High-fiber diet and Benefiber supplement  Call in 3 days for  biopsies  Call immediately for postop problem  Repeat colonoscopy in 5 years  We appreciate the referral thank you      Jani Montoya MD     Date: 9/1/2020  Time: 09:11

## 2020-09-01 NOTE — DISCHARGE INSTRUCTIONS
A responsible adult should stay with you and you should rest quietly for the rest of the day.    Do not drink alcohol, drive, operate any heavy machinery or power tools or make any legal/important decisions for the next 24 hours.     Progress your diet as tolerated.  If you begin to experience severe pain, increased shortness of breath, racing heartbeat or a fever above 101 F, seek immediate medical attention.     High-fiber diet and Benefiber supplement  Call in 3 days for biopsies  Call immediately for postop problem  Repeat colonoscopy in 5 years

## 2020-09-01 NOTE — ANESTHESIA POSTPROCEDURE EVALUATION
Patient: Tanna Bull    Procedure Summary     Date:  09/01/20 Room / Location:  [facility] ENDOSCOPY 1 / [facility] ENDOSCOPY    Anesthesia Start:  0841 Anesthesia Stop:  0911    Procedure:  COLONOSCOPY WITH POLYPECTOMY X4 (N/A ) Diagnosis:       Personal history of colonic polyps      (Personal history of colonic polyps [Z86.010])    Surgeon:  Jani Montoya MD Provider:  Nilesh Fermin MD    Anesthesia Type:  MAC ASA Status:  3          Anesthesia Type: MAC    Vitals  Vitals Value Taken Time   /61 9/1/2020  9:33 AM   Temp     Pulse 56 9/1/2020  9:38 AM   Resp 16 9/1/2020  9:33 AM   SpO2 99 % 9/1/2020  9:36 AM   Vitals shown include unvalidated device data.        Post Anesthesia Care and Evaluation    Patient location during evaluation: PACU  Patient participation: complete - patient participated  Level of consciousness: awake  Pain scale: See nurse's notes for pain score.  Pain management: adequate  Airway patency: patent  Anesthetic complications: No anesthetic complications  PONV Status: none  Cardiovascular status: acceptable  Respiratory status: acceptable  Hydration status: acceptable    Comments: Patient seen and examined postoperatively; vital signs stable; SpO2 greater than or equal to 90%; cardiopulmonary status stable; nausea/vomiting adequately controlled; pain adequately controlled; no apparent anesthesia complications; patient discharged from anesthesia care when discharge criteria were met

## 2020-09-02 LAB
LAB AP CASE REPORT: NORMAL
PATH REPORT.FINAL DX SPEC: NORMAL
PATH REPORT.GROSS SPEC: NORMAL

## 2020-10-04 RX ORDER — BUPROPION HYDROCHLORIDE 300 MG/1
TABLET ORAL
Qty: 90 TABLET | Refills: 1 | Status: SHIPPED | OUTPATIENT
Start: 2020-10-04 | End: 2021-03-30

## 2020-10-04 RX ORDER — SIMVASTATIN 40 MG
TABLET ORAL
Qty: 90 TABLET | Refills: 0 | Status: SHIPPED | OUTPATIENT
Start: 2020-10-04 | End: 2020-11-16

## 2020-10-24 RX ORDER — INSULIN ASPART 100 [IU]/ML
50 INJECTION, SUSPENSION SUBCUTANEOUS 2 TIMES DAILY
Qty: 90 ML | Refills: 2 | Status: SHIPPED | OUTPATIENT
Start: 2020-10-24 | End: 2021-07-06 | Stop reason: SDUPTHER

## 2020-11-16 RX ORDER — SIMVASTATIN 40 MG
TABLET ORAL
Qty: 90 TABLET | Refills: 0 | Status: SHIPPED | OUTPATIENT
Start: 2020-11-16 | End: 2021-01-26 | Stop reason: SDUPTHER

## 2020-12-26 RX ORDER — FUROSEMIDE 20 MG/1
TABLET ORAL
Qty: 90 TABLET | Refills: 0 | Status: SHIPPED | OUTPATIENT
Start: 2020-12-26 | End: 2021-01-26 | Stop reason: SDUPTHER

## 2021-01-13 LAB
25(OH)D3+25(OH)D2 SERPL-MCNC: 39.7 NG/ML (ref 30–100)
ALBUMIN SERPL-MCNC: 3.8 G/DL (ref 3.7–4.7)
ALBUMIN/GLOB SERPL: 1.4 {RATIO} (ref 1.2–2.2)
ALP SERPL-CCNC: 110 IU/L (ref 39–117)
ALT SERPL-CCNC: 17 IU/L (ref 0–32)
AMBIG ABBREV CMP14 DEFAULT: NORMAL
AST SERPL-CCNC: 15 IU/L (ref 0–40)
BILIRUB SERPL-MCNC: 0.5 MG/DL (ref 0–1.2)
BUN SERPL-MCNC: 27 MG/DL (ref 8–27)
BUN/CREAT SERPL: 20 (ref 12–28)
CALCIUM SERPL-MCNC: 9.1 MG/DL (ref 8.7–10.3)
CHLORIDE SERPL-SCNC: 103 MMOL/L (ref 96–106)
CHOLEST SERPL-MCNC: 136 MG/DL (ref 100–199)
CO2 SERPL-SCNC: 24 MMOL/L (ref 20–29)
CREAT SERPL-MCNC: 1.38 MG/DL (ref 0.57–1)
GLOBULIN SER CALC-MCNC: 2.7 G/DL (ref 1.5–4.5)
GLUCOSE SERPL-MCNC: 143 MG/DL (ref 65–99)
HBA1C MFR BLD: 6.9 % (ref 4.8–5.6)
HDLC SERPL-MCNC: 34 MG/DL
LDLC SERPL CALC-MCNC: 75 MG/DL (ref 0–99)
POTASSIUM SERPL-SCNC: 4.7 MMOL/L (ref 3.5–5.2)
PROT SERPL-MCNC: 6.5 G/DL (ref 6–8.5)
SODIUM SERPL-SCNC: 140 MMOL/L (ref 134–144)
TRIGL SERPL-MCNC: 157 MG/DL (ref 0–149)
VLDLC SERPL CALC-MCNC: 27 MG/DL (ref 5–40)

## 2021-01-26 ENCOUNTER — OFFICE VISIT (OUTPATIENT)
Dept: FAMILY MEDICINE CLINIC | Facility: CLINIC | Age: 71
End: 2021-01-26

## 2021-01-26 VITALS
SYSTOLIC BLOOD PRESSURE: 169 MMHG | HEART RATE: 54 BPM | BODY MASS INDEX: 40.95 KG/M2 | HEIGHT: 66 IN | OXYGEN SATURATION: 99 % | DIASTOLIC BLOOD PRESSURE: 87 MMHG | WEIGHT: 254.8 LBS | TEMPERATURE: 96.4 F

## 2021-01-26 DIAGNOSIS — E11.29 TYPE 2 DIABETES MELLITUS WITH OTHER DIABETIC KIDNEY COMPLICATION (HCC): ICD-10-CM

## 2021-01-26 DIAGNOSIS — I10 ESSENTIAL HYPERTENSION: ICD-10-CM

## 2021-01-26 DIAGNOSIS — E78.2 MIXED HYPERLIPIDEMIA: Primary | ICD-10-CM

## 2021-01-26 DIAGNOSIS — E55.9 VITAMIN D DEFICIENCY: ICD-10-CM

## 2021-01-26 DIAGNOSIS — Z12.31 BREAST CANCER SCREENING BY MAMMOGRAM: ICD-10-CM

## 2021-01-26 DIAGNOSIS — I10 ESSENTIAL HYPERTENSION: Primary | ICD-10-CM

## 2021-01-26 DIAGNOSIS — E78.2 MIXED HYPERLIPIDEMIA: ICD-10-CM

## 2021-01-26 PROCEDURE — 99213 OFFICE O/P EST LOW 20 MIN: CPT | Performed by: FAMILY MEDICINE

## 2021-01-26 RX ORDER — SIMVASTATIN 40 MG
40 TABLET ORAL DAILY
Qty: 90 TABLET | Refills: 3 | Status: SHIPPED | OUTPATIENT
Start: 2021-01-26 | End: 2021-01-26 | Stop reason: SDUPTHER

## 2021-01-26 RX ORDER — SIMVASTATIN 40 MG
40 TABLET ORAL DAILY
Qty: 15 TABLET | Refills: 0 | Status: SHIPPED | OUTPATIENT
Start: 2021-01-26 | End: 2021-12-05

## 2021-01-26 RX ORDER — FUROSEMIDE 20 MG/1
20 TABLET ORAL DAILY
Qty: 90 TABLET | Refills: 3 | Status: SHIPPED | OUTPATIENT
Start: 2021-01-26

## 2021-01-26 NOTE — PROGRESS NOTES
Subjective   Tanna Bull is a 70 y.o. female.     Here for follow-up on her blood pressure, cholesterol, diabetes, and vitamin D  She had her labs done prior to her visit  bp runs in 120s sys at home  Never checks bs  Flu shot at [facility]  Refuses pneumovax sec to prev reaction  Has covid shot scheduled  Needs refills on meds  Also needs order for mammogram       The following portions of the patient's history were reviewed and updated as appropriate: allergies, current medications, past family history, past medical history, past social history, past surgical history and problem list.  Past Medical History:   Diagnosis Date   • Arthritis    • Depression     Mild    • DM2 (diabetes mellitus, type 2) (CMS/HCC)    • High cholesterol    • HTN (hypertension)    • IDDM (insulin dependent diabetes mellitus)      Past Surgical History:   Procedure Laterality Date   • BACK SURGERY     • BREAST BIOPSY Left     core bx (unsure when)   • BUNIONECTOMY     • CATARACT EXTRACTION     • CHOLECYSTECTOMY     • COLONOSCOPY N/A 9/1/2020    Procedure: COLONOSCOPY WITH POLYPECTOMY X4;  Surgeon: Jani Montoya MD;  Location: [facility] ENDOSCOPY;  Service: Gastroenterology;  Laterality: N/A;  diverticulosis, colon polyps   • ORIF ANKLE FRACTURE Left 2009     Family History   Problem Relation Age of Onset   • Diabetes Other    • Stroke Other    • Breast cancer Other    • Breast cancer Sister 60     Social History     Socioeconomic History   • Marital status: Single     Spouse name: Not on file   • Number of children: Not on file   • Years of education: Not on file   • Highest education level: Not on file   Tobacco Use   • Smoking status: Never Smoker   • Smokeless tobacco: Never Used   Substance and Sexual Activity   • Alcohol use: Yes     Frequency: Never     Comment: occ   • Drug use: No   • Sexual activity: Defer         Current Outpatient Medications:   •  buPROPion XL (WELLBUTRIN XL) 300 MG 24 hr tablet, TAKE 1 TABLET DAILY,  "Disp: 90 tablet, Rfl: 1  •  BYSTOLIC 10 MG tablet, TAKE 1 TABLET DAILY, Disp: 90 tablet, Rfl: 3  •  furosemide (LASIX) 20 MG tablet, Take 1 tablet by mouth Daily., Disp: 90 tablet, Rfl: 3  •  Glucosamine-Chondroit-Collagen ([employer] GLUCO-CHONDROIT PLUS UC-II) 125-100-10 MG tablet, [employer]S GLUCO-CHONDROIT PLUS UC--100-10 MG TABS, Disp: , Rfl:   •  glucose blood (ONE TOUCH ULTRA TEST) test strip, ONETOUCH ULTRA BLUE STRP, Disp: , Rfl:   •  insulin aspart prot & aspart (NovoLOG Mix 70/30 FlexPen) (70-30) 100 UNIT/ML suspension pen-injector injection, Inject 0.5 mL under the skin into the appropriate area as directed 2 (two) times a day., Disp: 90 mL, Rfl: 2  •  Insulin Pen Needle (COMFORT EZ PEN NEEDLES) 31G X 5 MM misc, COMFORT EZ PEN NEEDLES 31G X 5 MM, Disp: , Rfl:   •  losartan (COZAAR) 50 MG tablet, Take 25 mg by mouth Daily., Disp: , Rfl:   •  simvastatin (ZOCOR) 40 MG tablet, Take 1 tablet by mouth Daily., Disp: 15 tablet, Rfl: 0  •  vitamin D (ERGOCALCIFEROL) 1.25 MG (46239 UT) capsule capsule, TAKE 1 CAPSULE WEEKLY, Disp: 12 capsule, Rfl: 3    Review of Systems   Constitutional: Negative for diaphoresis, fatigue, fever, unexpected weight gain and unexpected weight loss.   Respiratory: Negative for cough, chest tightness and shortness of breath.    Cardiovascular: Negative for chest pain, palpitations and leg swelling.   Gastrointestinal: Negative for nausea and vomiting.   Endocrine: Negative.    Neurological: Negative for dizziness, syncope and headache.     /87 (BP Location: Left arm, Patient Position: Sitting, Cuff Size: Large Adult)   Pulse 54   Temp 96.4 °F (35.8 °C) (Temporal)   Ht 167.6 cm (66\")   Wt 116 kg (254 lb 12.8 oz)   SpO2 99%   Breastfeeding No   BMI 41.13 kg/m²       Objective   Physical Exam  Vitals signs and nursing note reviewed.   Constitutional:       General: She is not in acute distress.     Appearance: Normal appearance. She is well-developed and well-groomed. She is " morbidly obese.   HENT:      Head: Normocephalic and atraumatic.   Neck:      Musculoskeletal: Neck supple.      Thyroid: No thyromegaly.   Cardiovascular:      Rate and Rhythm: Normal rate and regular rhythm.      Heart sounds: Normal heart sounds. No murmur. No friction rub. No gallop.    Pulmonary:      Effort: Pulmonary effort is normal. No respiratory distress.      Breath sounds: Normal breath sounds. No wheezing or rales.   Musculoskeletal:      Right lower leg: No edema.      Left lower leg: No edema.   Lymphadenopathy:      Cervical: No cervical adenopathy.   Skin:     General: Skin is warm and dry.   Neurological:      Mental Status: She is alert.   Psychiatric:         Mood and Affect: Mood normal.         Behavior: Behavior is cooperative.         Lab Results   Component Value Date    BUN 27 01/12/2021    CREATININE 1.38 (H) 01/12/2021    EGFRIFNONA 38 (L) 01/12/2021    EGFRIFAFRI 44 (L) 01/12/2021    BCR 20 01/12/2021    K 4.7 01/12/2021    CO2 24 01/12/2021    CALCIUM 9.1 01/12/2021    PROTENTOTREF 6.5 01/12/2021    ALBUMIN 3.8 01/12/2021    LABIL2 1.4 01/12/2021    AST 15 01/12/2021    ALT 17 01/12/2021     Lab Results   Component Value Date    CHLPL 136 01/12/2021    TRIG 157 (H) 01/12/2021    HDL 34 (L) 01/12/2021    LDL 75 01/12/2021     Lab Results   Component Value Date    HGBA1C 6.9 (H) 01/12/2021         Assessment/Plan   Problems Addressed this Visit        Cardiac and Vasculature    Hyperlipidemia    Relevant Medications    simvastatin (ZOCOR) 40 MG tablet    Hypertension - Primary    Relevant Medications    furosemide (LASIX) 20 MG tablet       Endocrine and Metabolic    Type 2 diabetes mellitus with other diabetic kidney complication (CMS/HCC)    Relevant Medications    furosemide (LASIX) 20 MG tablet    Vitamin D deficiency      Other Visit Diagnoses     Breast cancer screening by mammogram        Relevant Orders    Mammo Screening Digital Tomosynthesis Bilateral With CAD      Diagnoses        Codes Comments    Essential hypertension    -  Primary ICD-10-CM: I10  ICD-9-CM: 401.9     Mixed hyperlipidemia     ICD-10-CM: E78.2  ICD-9-CM: 272.2     Type 2 diabetes mellitus with other diabetic kidney complication (CMS/HCC)     ICD-10-CM: E11.29  ICD-9-CM: 250.40     Vitamin D deficiency     ICD-10-CM: E55.9  ICD-9-CM: 268.9     Breast cancer screening by mammogram     ICD-10-CM: Z12.31  ICD-9-CM: V76.12           Counseled on the need for weight loss through exercise and healthy eating   Counseled on diet  Mammogram ordered  Labs reviewed with her  Prescription refill sent  To has an eye appointment coming up this week  I will see her back in 6 months for follow-up and Medicare wellness

## 2021-02-02 ENCOUNTER — HOSPITAL ENCOUNTER (OUTPATIENT)
Dept: MAMMOGRAPHY | Facility: HOSPITAL | Age: 71
Discharge: HOME | End: 2021-02-02
Admitting: FAMILY MEDICINE

## 2021-02-02 DIAGNOSIS — Z12.31 BREAST CANCER SCREENING BY MAMMOGRAM: ICD-10-CM

## 2021-02-02 PROCEDURE — 77067 SCR MAMMO BI INCL CAD: CPT

## 2021-02-02 PROCEDURE — 77063 BREAST TOMOSYNTHESIS BI: CPT

## 2021-03-30 RX ORDER — BUPROPION HYDROCHLORIDE 300 MG/1
TABLET ORAL
Qty: 90 TABLET | Refills: 2 | Status: SHIPPED | OUTPATIENT
Start: 2021-03-30 | End: 2021-12-05

## 2021-07-04 RX ORDER — ERGOCALCIFEROL 1.25 MG/1
CAPSULE ORAL
Qty: 12 CAPSULE | Refills: 3 | Status: SHIPPED | OUTPATIENT
Start: 2021-07-04 | End: 2022-06-06

## 2021-07-06 ENCOUNTER — OFFICE VISIT (OUTPATIENT)
Dept: FAMILY MEDICINE CLINIC | Facility: CLINIC | Age: 71
End: 2021-07-06

## 2021-07-06 VITALS
DIASTOLIC BLOOD PRESSURE: 83 MMHG | SYSTOLIC BLOOD PRESSURE: 145 MMHG | HEIGHT: 66 IN | TEMPERATURE: 98.6 F | BODY MASS INDEX: 40.18 KG/M2 | HEART RATE: 54 BPM | OXYGEN SATURATION: 98 % | WEIGHT: 250 LBS

## 2021-07-06 DIAGNOSIS — E78.2 MIXED HYPERLIPIDEMIA: ICD-10-CM

## 2021-07-06 DIAGNOSIS — I10 ESSENTIAL HYPERTENSION: ICD-10-CM

## 2021-07-06 DIAGNOSIS — M25.562 ACUTE PAIN OF LEFT KNEE: Primary | ICD-10-CM

## 2021-07-06 DIAGNOSIS — E11.9 TYPE 2 DIABETES MELLITUS WITHOUT COMPLICATION, WITH LONG-TERM CURRENT USE OF INSULIN (HCC): ICD-10-CM

## 2021-07-06 DIAGNOSIS — Z79.4 TYPE 2 DIABETES MELLITUS WITHOUT COMPLICATION, WITH LONG-TERM CURRENT USE OF INSULIN (HCC): ICD-10-CM

## 2021-07-06 PROCEDURE — 99214 OFFICE O/P EST MOD 30 MIN: CPT | Performed by: FAMILY MEDICINE

## 2021-07-06 RX ORDER — INSULIN ASPART 100 [IU]/ML
50 INJECTION, SUSPENSION SUBCUTANEOUS 2 TIMES DAILY
Qty: 90 ML | Refills: 3 | Status: SHIPPED | OUTPATIENT
Start: 2021-07-06 | End: 2022-10-23

## 2021-07-06 RX ORDER — HYDROCODONE BITARTRATE AND ACETAMINOPHEN 5; 325 MG/1; MG/1
1 TABLET ORAL EVERY 6 HOURS PRN
Qty: 10 TABLET | Refills: 0 | Status: SHIPPED | OUTPATIENT
Start: 2021-07-06

## 2021-07-06 NOTE — PROGRESS NOTES
Subjective   Tanna Bull is a 70 y.o. female.     She comes in with complaints of pain in her left leg just below the left knee  Is making it very difficult to walk secondary to the pain  This started on June 13-14  Car trip after pain started  Took some old norco she had  No improvement  Denies trauma  Also needs follow up on bp, chol, dm  Does not check her bs  Was scheduled for later this month but will see her today  She feels well other than her knee       The following portions of the patient's history were reviewed and updated as appropriate: allergies, current medications, past family history, past medical history, past social history, past surgical history, and problem list.  Past Medical History:   Diagnosis Date   • Arthritis    • Depression     Mild    • DM2 (diabetes mellitus, type 2) (CMS/HCC)    • High cholesterol    • HTN (hypertension)    • IDDM (insulin dependent diabetes mellitus)      Past Surgical History:   Procedure Laterality Date   • BACK SURGERY     • BREAST BIOPSY Left     core bx (unsure when)   • BUNIONECTOMY     • CATARACT EXTRACTION     • CHOLECYSTECTOMY     • COLONOSCOPY N/A 9/1/2020    Procedure: COLONOSCOPY WITH POLYPECTOMY X4;  Surgeon: Jani Montoya MD;  Location: [facility] ENDOSCOPY;  Service: Gastroenterology;  Laterality: N/A;  diverticulosis, colon polyps   • ORIF ANKLE FRACTURE Left 2009     Family History   Problem Relation Age of Onset   • Diabetes Other    • Stroke Other    • Breast cancer Other    • Breast cancer Sister 60     Social History     Socioeconomic History   • Marital status: Single     Spouse name: Not on file   • Number of children: Not on file   • Years of education: Not on file   • Highest education level: Not on file   Tobacco Use   • Smoking status: Never Smoker   • Smokeless tobacco: Never Used   Vaping Use   • Vaping Use: Never used   Substance and Sexual Activity   • Alcohol use: Yes     Comment: occ   • Drug use: No   • Sexual activity:  "Defer         Current Outpatient Medications:   •  buPROPion XL (WELLBUTRIN XL) 300 MG 24 hr tablet, TAKE 1 TABLET DAILY, Disp: 90 tablet, Rfl: 2  •  BYSTOLIC 10 MG tablet, TAKE 1 TABLET DAILY, Disp: 90 tablet, Rfl: 3  •  furosemide (LASIX) 20 MG tablet, Take 1 tablet by mouth Daily., Disp: 90 tablet, Rfl: 3  •  Glucosamine-Chondroit-Collagen ([employer] GLUCO-CHONDROIT PLUS UC-II) 125-100-10 MG tablet, [employer]S GLUCO-CHONDROIT PLUS UC--100-10 MG TABS, Disp: , Rfl:   •  glucose blood (ONE TOUCH ULTRA TEST) test strip, ONETOUCH ULTRA BLUE STRP, Disp: , Rfl:   •  insulin aspart prot & aspart (NovoLOG Mix 70/30 FlexPen) (70-30) 100 UNIT/ML suspension pen-injector injection, Inject 0.5 mL under the skin into the appropriate area as directed 2 (two) times a day., Disp: 90 mL, Rfl: 3  •  Insulin Pen Needle (COMFORT EZ PEN NEEDLES) 31G X 5 MM misc, COMFORT EZ PEN NEEDLES 31G X 5 MM, Disp: , Rfl:   •  losartan (COZAAR) 50 MG tablet, Take 25 mg by mouth Daily., Disp: , Rfl:   •  simvastatin (ZOCOR) 40 MG tablet, Take 1 tablet by mouth Daily., Disp: 15 tablet, Rfl: 0  •  vitamin D (ERGOCALCIFEROL) 1.25 MG (02737 UT) capsule capsule, TAKE 1 CAPSULE WEEKLY, Disp: 12 capsule, Rfl: 3  •  HYDROcodone-acetaminophen (Norco) 5-325 MG per tablet, Take 1 tablet by mouth Every 6 (Six) Hours As Needed for Moderate Pain ., Disp: 10 tablet, Rfl: 0    Review of Systems   Constitutional: Negative.    Respiratory: Negative.    Cardiovascular: Negative.    Gastrointestinal: Negative for nausea and vomiting.   Endocrine: Negative.    Musculoskeletal: Positive for arthralgias.   Neurological: Negative for dizziness, syncope, light-headedness and headache.   Hematological: Negative for adenopathy.     /83 (BP Location: Left arm, Patient Position: Sitting, Cuff Size: Large Adult)   Pulse 54   Temp 98.6 °F (37 °C) (Temporal)   Ht 167.6 cm (66\")   Wt 113 kg (250 lb)   SpO2 98%   Breastfeeding No   BMI 40.35 kg/m²       Objective   Physical " Exam  Vitals and nursing note reviewed.   Constitutional:       General: She is not in acute distress.     Appearance: Normal appearance. She is well-developed. She is morbidly obese.   HENT:      Head: Normocephalic and atraumatic.   Neck:      Thyroid: No thyromegaly.   Cardiovascular:      Rate and Rhythm: Normal rate and regular rhythm.      Heart sounds: Normal heart sounds. No murmur heard.   No friction rub. No gallop.    Pulmonary:      Effort: Pulmonary effort is normal. No respiratory distress.      Breath sounds: Normal breath sounds. No wheezing or rales.   Musculoskeletal:      Cervical back: Neck supple.      Left knee: Swelling present. No effusion, erythema, ecchymosis or bony tenderness. Decreased range of motion.      Right lower leg: No edema.      Left lower leg: No edema.      Comments: No calf tenderness, erythema, or warmth   Lymphadenopathy:      Cervical: No cervical adenopathy.   Skin:     General: Skin is warm and dry.   Neurological:      Mental Status: She is alert.   Psychiatric:         Mood and Affect: Mood normal.         Behavior: Behavior is cooperative.           Assessment/Plan   Problems Addressed this Visit        Cardiac and Vasculature    Hyperlipidemia    Hypertension       Endocrine and Metabolic    Type 2 diabetes mellitus without complication (CMS/[facility])    Relevant Medications    insulin aspart prot & aspart (NovoLOG Mix 70/30 FlexPen) (70-30) 100 UNIT/ML suspension pen-injector injection      Other Visit Diagnoses     Acute pain of left knee    -  Primary    Relevant Medications    HYDROcodone-acetaminophen (Norco) 5-325 MG per tablet    Other Relevant Orders    Ambulatory Referral to Orthopedic Surgery      Diagnoses       Codes Comments    Acute pain of left knee    -  Primary ICD-10-CM: M25.562  ICD-9-CM: 719.46     Mixed hyperlipidemia     ICD-10-CM: E78.2  ICD-9-CM: 272.2     Essential hypertension     ICD-10-CM: I10  ICD-9-CM: 401.9     Type 2 diabetes mellitus  without complication, with long-term current use of insulin (CMS/[facility])     ICD-10-CM: E11.9, Z79.4  ICD-9-CM: 250.00, V58.67           Warm compresses to knee  Will refer to ortho for further eval and treatment  4 in ACE wrap applied  She was counseled on the need for weight loss and need for dietary compliance  Lab orders are in the computer  Will see her back in 6 mo  Insulin refilled

## 2021-07-09 ENCOUNTER — OFFICE VISIT (OUTPATIENT)
Dept: ORTHOPEDIC SURGERY | Facility: CLINIC | Age: 71
End: 2021-07-09

## 2021-07-09 VITALS
BODY MASS INDEX: 40.18 KG/M2 | DIASTOLIC BLOOD PRESSURE: 80 MMHG | WEIGHT: 250 LBS | HEIGHT: 66 IN | HEART RATE: 57 BPM | SYSTOLIC BLOOD PRESSURE: 171 MMHG

## 2021-07-09 DIAGNOSIS — M25.562 ACUTE PAIN OF LEFT KNEE: ICD-10-CM

## 2021-07-09 DIAGNOSIS — M17.12 PRIMARY OSTEOARTHRITIS OF LEFT KNEE: Primary | ICD-10-CM

## 2021-07-09 PROCEDURE — 99203 OFFICE O/P NEW LOW 30 MIN: CPT | Performed by: ORTHOPAEDIC SURGERY

## 2021-07-12 NOTE — PROGRESS NOTES
Patient ID: Tanna Bull     Chief Complaint:    Chief Complaint   Patient presents with   • Left Knee - Pain        HPI:    Tanna Bull is a 70 y.o. who presents today for evaluation of left knee pain.  Patient states symptoms started about 1 month ago after increase in activity when she was traveling with family.  States she does feel things are getting better but still has some left pain and stiffness.  No recent trauma.  Denies distal numbness or tingling.    Social History     Socioeconomic History   • Marital status: Single     Spouse name: Not on file   • Number of children: Not on file   • Years of education: Not on file   • Highest education level: Not on file   Tobacco Use   • Smoking status: Never Smoker   • Smokeless tobacco: Never Used   Vaping Use   • Vaping Use: Never used   Substance and Sexual Activity   • Alcohol use: Yes     Comment: occ   • Drug use: No   • Sexual activity: Defer     Past Medical History:   Diagnosis Date   • Arthritis    • Depression     Mild    • DM2 (diabetes mellitus, type 2) (CMS/[facility])    • High cholesterol    • HTN (hypertension)    • IDDM (insulin dependent diabetes mellitus)      Family History   Problem Relation Age of Onset   • Diabetes Other    • Stroke Other    • Breast cancer Other    • Breast cancer Sister 60       ROS:    ROS:  Constitutional:  Denies fever, shaking or chills   Eyes:  Denies change in visual acuity   HENT:  Denies nasal congestion or sore throat   Respiratory:  Denies cough or shortness of breath   Cardiovascular:  Denies chest pain or edema   GI:  Denies abdominal pain, nausea, vomiting, bloody stools or diarrhea   Musculoskeletal:  Denies numbness tingling or loss of motor function except as outlined above in history of present illness.  Integument:  Denies rash, lesion or ulceration   Neurologic:  Denies headache or focal weakness  Lymphatics: No lymphadenopathy, no lymphedema      All other pertinent positives and negatives as noted above  "in HPI.    Physical Exam:     Vital Signs:  /80   Pulse 57   Ht 167.6 cm (66\")   Wt 113 kg (250 lb)   BMI 40.35 kg/m²   Constitutional: Awake alert and oriented x3, well developed, well nourished, no acute distress, non-toxic appearance.  HENT:  Normocephalic, Atraumatic, Bilateral external ears normal, Oropharynx moist, No oral exudates, Nose normal.   Respiratory:  No respiratory distress, No wheezing  Vascular:  Brisk cap refill, Intact distal pulses, No cyanosis, all compartments soft with no signs or symptoms of compartment syndrome or DVT.  Neurologic: Sensation grossly intact to light touch throughout the involved extremity and bilaterally symmetric, deep tendon reflexes are 2+ and bilaterally symmetric, No focal deficits noted.   Neck:  Normal range of motion, No tenderness, Supple, Negative Spurlings.  Integument: Well hydrated, no rash, warm, dry, no lesions or ulceration.   Lymphatics: No obvious lymphadenopathy, No lymphedema    Musculoskeletal:    Exam of the left  knee:  Painful gait with a subtle limp  No muscle atrophy, erythema, ecchymosis, or gross deformity noted  no knee effusion  + medial> lateral joint line tenderness  Active range of motion 0-90 active  and 0-120 passive.  4/5 strength flexion and extension  The knee is stable to varus and valgus stress testing  Mild varus alignment of the limb  Lachman negative  Posterior drawer negative  Reny's indeterminate  Patellofemoral grind +  Sensation grossly intact to light tough throughout the lower extremity  Skin is intact  Distal pulses are 2+  No signs or symptoms of DVT      Bilateral Hip exam:  No atrophy, erythema, ecchymosis, or gross deformity noted  No tenderness to palpation  Slightly dimished active range of motion, mild lack of IR but nonpainful  5/5 strength in hip flexion, abduction, and adduction  Anterior, lateral, and posterior hip impingement tests negative  Stinchfield and straight leg raise negative  GLEN " negative        Diagnostic Studies:     Imaging was personally and individually reviewed and discussed at length with the patient:    4V left knee(s) were taken in the office today, including AP, flexion PA, lateral, and sunrise views to evaluate the patient's complaint:  Weight bearing views show moderate degenerative changes in all three compartments with the medial compartment being most affected.  There is early osteophyte formation throughout all three compartments.  There is no evidence of fracture or dislocation.  No periosteal reactions or medullary lesions are seen.  Patellar height and alignment are within normal limits.     Comparison films not available    AP pelvis was taken in the office today: Mild degenerative change about hip joints.  No acute fractures noted.            Assessment:     left  Knee Osteoarthritis versus possible acute on chronic meniscal tear.            Plan:     Based on x-rays, history, and office evaluation, we have diagnosed Tanna Bull with knee arthritis. At this time, we recommend starting with a conservative treatment program. This will consist of cortisone injection during significant flare-ups, NSAIDS for daily maintenance, physical therapy for strengthening and modalities as indicated, and bracing when appropriate. These measures will continue, until symptoms are no longer relieved, become more severe or function begins to significantly deteriorate. At that point joint replacement options will be discussed.    Plan for conservative treatment at this time states that she is feeling a little bit better but will take some regular anti-inflammatories over next few weeks as well as activity modification.  She like to hold off on any injection or PT at this time.    Follow up in as needed unless symptoms return or a new issue occurs.  Patient will call the office to schedule an appointment.     All questions were answered, the patient understands and agrees with the  plan.

## 2021-07-21 LAB
25(OH)D3+25(OH)D2 SERPL-MCNC: 38.7 NG/ML (ref 30–100)
ALBUMIN SERPL-MCNC: 4.2 G/DL (ref 3.7–4.7)
ALBUMIN/GLOB SERPL: 1.8 {RATIO} (ref 1.2–2.2)
ALP SERPL-CCNC: 97 IU/L (ref 48–121)
ALT SERPL-CCNC: 14 IU/L (ref 0–32)
AMBIG ABBREV CMP14 DEFAULT: NORMAL
AST SERPL-CCNC: 15 IU/L (ref 0–40)
BILIRUB SERPL-MCNC: 0.4 MG/DL (ref 0–1.2)
BUN SERPL-MCNC: 23 MG/DL (ref 8–27)
BUN/CREAT SERPL: 19 (ref 12–28)
CALCIUM SERPL-MCNC: 9.3 MG/DL (ref 8.7–10.3)
CHLORIDE SERPL-SCNC: 103 MMOL/L (ref 96–106)
CHOLEST SERPL-MCNC: 155 MG/DL (ref 100–199)
CO2 SERPL-SCNC: 24 MMOL/L (ref 20–29)
CREAT SERPL-MCNC: 1.23 MG/DL (ref 0.57–1)
GLOBULIN SER CALC-MCNC: 2.4 G/DL (ref 1.5–4.5)
GLUCOSE SERPL-MCNC: 128 MG/DL (ref 65–99)
HBA1C MFR BLD: 7.4 % (ref 4.8–5.6)
HDLC SERPL-MCNC: 39 MG/DL
LDLC SERPL CALC-MCNC: 86 MG/DL (ref 0–99)
POTASSIUM SERPL-SCNC: 5.1 MMOL/L (ref 3.5–5.2)
PROT SERPL-MCNC: 6.6 G/DL (ref 6–8.5)
SODIUM SERPL-SCNC: 141 MMOL/L (ref 134–144)
TRIGL SERPL-MCNC: 171 MG/DL (ref 0–149)
VLDLC SERPL CALC-MCNC: 30 MG/DL (ref 5–40)

## 2021-08-15 RX ORDER — NEBIVOLOL HYDROCHLORIDE 10 MG/1
TABLET ORAL
Qty: 90 TABLET | Refills: 2 | Status: SHIPPED | OUTPATIENT
Start: 2021-08-15 | End: 2022-04-09

## 2021-08-23 ENCOUNTER — TELEPHONE (OUTPATIENT)
Dept: FAMILY MEDICINE CLINIC | Facility: CLINIC | Age: 71
End: 2021-08-23

## 2021-08-23 NOTE — TELEPHONE ENCOUNTER
PATIENT CALLING TO SAY SHE DIDN'T MISS AN APPOINTMENT ON 07/29 WITH DR GUTIÉRREZ BECAUSE ALL HER ISSUES WERE RESOLVED ON 07/09 APPOINTMENT. PLEASE ADVISE, THANK YOU!

## 2021-12-05 RX ORDER — SIMVASTATIN 40 MG
TABLET ORAL
Qty: 90 TABLET | Refills: 2 | Status: SHIPPED | OUTPATIENT
Start: 2021-12-05 | End: 2022-10-10

## 2021-12-05 RX ORDER — BUPROPION HYDROCHLORIDE 300 MG/1
TABLET ORAL
Qty: 90 TABLET | Refills: 2 | Status: SHIPPED | OUTPATIENT
Start: 2021-12-05 | End: 2022-10-10

## 2022-02-08 LAB
25(OH)D3+25(OH)D2 SERPL-MCNC: 42 NG/ML (ref 30–100)
ALBUMIN SERPL-MCNC: 4.1 G/DL (ref 3.7–4.7)
ALBUMIN/GLOB SERPL: 1.6 {RATIO} (ref 1.2–2.2)
ALP SERPL-CCNC: 124 IU/L (ref 44–121)
ALT SERPL-CCNC: 14 IU/L (ref 0–32)
AMBIG ABBREV CMP14 DEFAULT: NORMAL
AMBIG ABBREV LP DEFAULT: NORMAL
AST SERPL-CCNC: 12 IU/L (ref 0–40)
BILIRUB SERPL-MCNC: 0.3 MG/DL (ref 0–1.2)
BUN SERPL-MCNC: 26 MG/DL (ref 8–27)
BUN/CREAT SERPL: 21 (ref 12–28)
CALCIUM SERPL-MCNC: 9.3 MG/DL (ref 8.7–10.3)
CHLORIDE SERPL-SCNC: 103 MMOL/L (ref 96–106)
CHOLEST SERPL-MCNC: 156 MG/DL (ref 100–199)
CO2 SERPL-SCNC: 24 MMOL/L (ref 20–29)
CREAT SERPL-MCNC: 1.25 MG/DL (ref 0.57–1)
GLOBULIN SER CALC-MCNC: 2.5 G/DL (ref 1.5–4.5)
GLUCOSE SERPL-MCNC: 205 MG/DL (ref 65–99)
HBA1C MFR BLD: 7.5 % (ref 4.8–5.6)
HDLC SERPL-MCNC: 37 MG/DL
LDLC SERPL CALC-MCNC: 92 MG/DL (ref 0–99)
POTASSIUM SERPL-SCNC: 5 MMOL/L (ref 3.5–5.2)
PROT SERPL-MCNC: 6.6 G/DL (ref 6–8.5)
SODIUM SERPL-SCNC: 139 MMOL/L (ref 134–144)
TRIGL SERPL-MCNC: 154 MG/DL (ref 0–149)
VLDLC SERPL CALC-MCNC: 27 MG/DL (ref 5–40)

## 2022-02-14 RX ORDER — LOSARTAN POTASSIUM 50 MG/1
50 TABLET ORAL DAILY
Start: 2022-02-14

## 2022-02-17 ENCOUNTER — OFFICE VISIT (OUTPATIENT)
Dept: FAMILY MEDICINE CLINIC | Facility: CLINIC | Age: 72
End: 2022-02-17

## 2022-02-17 ENCOUNTER — TELEPHONE (OUTPATIENT)
Dept: FAMILY MEDICINE CLINIC | Facility: CLINIC | Age: 72
End: 2022-02-17

## 2022-02-17 VITALS
HEART RATE: 51 BPM | DIASTOLIC BLOOD PRESSURE: 77 MMHG | HEIGHT: 66 IN | SYSTOLIC BLOOD PRESSURE: 130 MMHG | OXYGEN SATURATION: 99 % | TEMPERATURE: 97.8 F | BODY MASS INDEX: 40.82 KG/M2 | WEIGHT: 254 LBS

## 2022-02-17 DIAGNOSIS — E55.9 VITAMIN D DEFICIENCY: ICD-10-CM

## 2022-02-17 DIAGNOSIS — I10 PRIMARY HYPERTENSION: ICD-10-CM

## 2022-02-17 DIAGNOSIS — Z79.4 TYPE 2 DIABETES MELLITUS WITH CHRONIC KIDNEY DISEASE, WITH LONG-TERM CURRENT USE OF INSULIN, UNSPECIFIED CKD STAGE: ICD-10-CM

## 2022-02-17 DIAGNOSIS — E78.2 MIXED HYPERLIPIDEMIA: ICD-10-CM

## 2022-02-17 DIAGNOSIS — F33.0 MAJOR DEPRESSIVE DISORDER, RECURRENT, MILD: ICD-10-CM

## 2022-02-17 DIAGNOSIS — E11.22 TYPE 2 DIABETES MELLITUS WITH CHRONIC KIDNEY DISEASE, WITH LONG-TERM CURRENT USE OF INSULIN, UNSPECIFIED CKD STAGE: ICD-10-CM

## 2022-02-17 DIAGNOSIS — Z12.31 BREAST CANCER SCREENING BY MAMMOGRAM: Primary | ICD-10-CM

## 2022-02-17 DIAGNOSIS — E78.2 MIXED HYPERLIPIDEMIA: Primary | ICD-10-CM

## 2022-02-17 PROBLEM — Z23 NEED FOR SHINGLES VACCINE: Status: RESOLVED | Noted: 2022-02-17 | Resolved: 2022-02-17

## 2022-02-17 PROBLEM — E66.01 MORBID (SEVERE) OBESITY DUE TO EXCESS CALORIES: Status: ACTIVE | Noted: 2022-02-17

## 2022-02-17 PROBLEM — Z23 NEED FOR SHINGLES VACCINE: Status: ACTIVE | Noted: 2022-02-17

## 2022-02-17 PROCEDURE — 99213 OFFICE O/P EST LOW 20 MIN: CPT | Performed by: FAMILY MEDICINE

## 2022-02-17 NOTE — PROGRESS NOTES
Subjective   Tanna Bull is a 71 y.o. female.     History of Present Illness     The patient presents for follow-up for hyperlipidemia, hypertension and type 2 diabetes mellitus.     Vaccines.   The patient is up to date on all of her vaccinations. She did not receive the second dose of Prevnar due to a bad reaction from the first one. She had a shingles vaccine in 2012, and is curious if she should get a new one.     Hypertension.  The patient denies any chest pain, difficulty breathing. She did increase her losartan to 50 mg.     Diabetes.   The patient denies any numbness or tingling in her feet. She last saw her nephrologist on Monday, 02/14/2022. When she was last seen 7 months ago her A1c was 7.4. It is now at 7.5. She does not check her sugars at home, but she just got a new meter yesterday and will start checking.     Medication management.   The patient is up to date on her medications.     The following portions of the patient's history were reviewed and updated as appropriate: allergies, current medications, past family history, past medical history, past social history, past surgical history, and problem list.  Past Medical History:   Diagnosis Date   • Arthritis    • Cataract 2009   • Cholelithiasis ?   • Colon polyp 2009   • Depression     Mild    • DM2 (diabetes mellitus, type 2) (HCC)    • High cholesterol    • HTN (hypertension)    • IDDM (insulin dependent diabetes mellitus)    • Obesity      Past Surgical History:   Procedure Laterality Date   • BACK SURGERY     • BREAST BIOPSY Left     core bx (unsure when)   • BUNIONECTOMY     • CATARACT EXTRACTION     • CHOLECYSTECTOMY     • COLONOSCOPY N/A 9/1/2020    Procedure: COLONOSCOPY WITH POLYPECTOMY X4;  Surgeon: Jani Montoya MD;  Location: [facility] ENDOSCOPY;  Service: Gastroenterology;  Laterality: N/A;  diverticulosis, colon polyps   • ORIF ANKLE FRACTURE Left 2009     Family History   Problem Relation Age of Onset   • Diabetes Other     • Stroke Other    • Breast cancer Other    • Breast cancer Sister 60   • Asthma Sister    • Cancer Sister    • Diabetes Maternal Aunt      Social History     Socioeconomic History   • Marital status: Single   Tobacco Use   • Smoking status: Former Smoker     Packs/day: 0.50     Years: 15.00     Pack years: 7.50     Types: Cigarettes     Start date: 1967     Quit date: 1995     Years since quittin.1   • Smokeless tobacco: Never Used   Vaping Use   • Vaping Use: Never used   Substance and Sexual Activity   • Alcohol use: Yes     Alcohol/week: 0.0 standard drinks     Comment: Once a month   • Drug use: No   • Sexual activity: Not Currently         Current Outpatient Medications:   •  buPROPion XL (WELLBUTRIN XL) 300 MG 24 hr tablet, TAKE 1 TABLET DAILY, Disp: 90 tablet, Rfl: 2  •  Bystolic 10 MG tablet, TAKE 1 TABLET DAILY, Disp: 90 tablet, Rfl: 2  •  furosemide (LASIX) 20 MG tablet, Take 1 tablet by mouth Daily., Disp: 90 tablet, Rfl: 3  •  Glucosamine-Chondroit-Collagen ([employer]S GLUCO-CHONDROIT PLUS UC-II) 125-100-10 MG tablet, [facility]S GLUCO-CHONDROIT PLUS UC--100-10 MG TABS, Disp: , Rfl:   •  glucose blood test strip, One Touch Verio Test Strips- Use to check blood sugar three times a day. Dx: e11.65, Disp: 300 each, Rfl: 3  •  HYDROcodone-acetaminophen (Norco) 5-325 MG per tablet, Take 1 tablet by mouth Every 6 (Six) Hours As Needed for Moderate Pain ., Disp: 10 tablet, Rfl: 0  •  insulin aspart prot & aspart (NovoLOG Mix 70/30 FlexPen) (70-30) 100 UNIT/ML suspension pen-injector injection, Inject 0.5 mL under the skin into the appropriate area as directed 2 (two) times a day., Disp: 90 mL, Rfl: 3  •  Insulin Pen Needle (COMFORT EZ PEN NEEDLES) 31G X 5 MM misc, COMFORT EZ PEN NEEDLES 31G X 5 MM, Disp: , Rfl:   •  losartan (COZAAR) 50 MG tablet, Take 1 tablet by mouth Daily., Disp: , Rfl:   •  simvastatin (ZOCOR) 40 MG tablet, TAKE 1 TABLET DAILY, Disp: 90 tablet, Rfl: 2  •  vitamin D (ERGOCALCIFEROL)  "1.25 MG (49031 UT) capsule capsule, TAKE 1 CAPSULE WEEKLY, Disp: 12 capsule, Rfl: 3    Review of Systems   Review of systems was performed, and pertinent findings are noted in the HPI.    /77 (BP Location: Left arm, Patient Position: Sitting, Cuff Size: Large Adult)   Pulse 51   Temp 97.8 °F (36.6 °C) (Temporal)   Ht 167.6 cm (66\")   Wt 115 kg (254 lb)   SpO2 99%   Breastfeeding No   BMI 41.00 kg/m²       Objective   Physical Exam  Vitals and nursing note reviewed.   Constitutional:       General: She is not in acute distress.     Appearance: Normal appearance. She is well-developed and well-groomed. She is morbidly obese. She is not ill-appearing.   HENT:      Head: Normocephalic and atraumatic.   Neck:      Thyroid: No thyromegaly.   Cardiovascular:      Rate and Rhythm: Normal rate and regular rhythm.      Heart sounds: Normal heart sounds. No murmur heard.  No friction rub. No gallop.    Pulmonary:      Effort: Pulmonary effort is normal. No respiratory distress.      Breath sounds: Normal breath sounds. No wheezing or rales.   Musculoskeletal:      Cervical back: Neck supple.      Right lower leg: No edema.      Left lower leg: No edema.   Lymphadenopathy:      Cervical: No cervical adenopathy.   Skin:     General: Skin is warm and dry.   Neurological:      Mental Status: She is alert.   Psychiatric:         Mood and Affect: Mood normal.       Labs were done before visit and are in the chart and were done at [facility]    Assessment/Plan   Problems Addressed this Visit        Cardiac and Vasculature    Hyperlipidemia - Primary     - Under better control but she was counseled on the need for improved diet and increased exercise. She is on simvastatin 40 mg.          Hypertension     - Doing fairly well. Her blood pressure medication is losartan 50 mg and Bystolic 10 mg.              Endocrine and Metabolic    Type 2 diabetes mellitus with diabetic chronic kidney disease (HCC)     - Control is little " worse, so she was counseled on the need for improved diet and exercise. She will continue her insulin twice a day at 51 units each time. She is currently on NovoLog mixed 7030 flex pin.          Vitamin D deficiency      Other Visit Diagnoses     Major depressive disorder, recurrent, mild (HCC)          Diagnoses       Codes Comments    Mixed hyperlipidemia    -  Primary ICD-10-CM: E78.2  ICD-9-CM: 272.2     Primary hypertension     ICD-10-CM: I10  ICD-9-CM: 401.9     Type 2 diabetes mellitus with chronic kidney disease, with long-term current use of insulin, unspecified CKD stage (HCC)     ICD-10-CM: E11.22, Z79.4  ICD-9-CM: 250.40, 585.9, V58.67     Vitamin D deficiency     ICD-10-CM: E55.9  ICD-9-CM: 268.9     Major depressive disorder, recurrent, mild (HCC)     ICD-10-CM: F33.0  ICD-9-CM: 296.31                 Transcribed from ambient dictation for Karina Arvizu MD by Lizbeth Crain.  02/17/22   18:16 EST    Patient verbalized consent to the visit recording.

## 2022-02-17 NOTE — ASSESSMENT & PLAN NOTE
- Under better control but she was counseled on the need for improved diet and increased exercise. She is on simvastatin 40 mg.

## 2022-02-17 NOTE — TELEPHONE ENCOUNTER
Pt is requesting a mammogram order for [facility]. She would also like lab orders for before her next appt to be [facility]

## 2022-02-17 NOTE — ASSESSMENT & PLAN NOTE
- Control is little worse, so she was counseled on the need for improved diet and exercise. She will continue her insulin twice a day at 51 units each time. She is currently on NovoLog mixed 7030 flex pin.

## 2022-02-17 NOTE — ASSESSMENT & PLAN NOTE
- She has had Zostavax in the past but has never had Shingrix. I have talked with her about the benefits of Shingrix. At this time, she has decided that she does not want to pursue that, but if she changes her mind, she knows she can talk to the pharmacist.

## 2022-03-01 ENCOUNTER — HOSPITAL ENCOUNTER (OUTPATIENT)
Dept: MAMMOGRAPHY | Facility: HOSPITAL | Age: 72
Discharge: HOME | End: 2022-03-01
Admitting: FAMILY MEDICINE

## 2022-03-01 DIAGNOSIS — Z12.31 BREAST CANCER SCREENING BY MAMMOGRAM: ICD-10-CM

## 2022-03-01 PROCEDURE — 77067 SCR MAMMO BI INCL CAD: CPT

## 2022-03-01 PROCEDURE — 77063 BREAST TOMOSYNTHESIS BI: CPT

## 2022-04-09 RX ORDER — NEBIVOLOL 10 MG/1
TABLET ORAL
Qty: 90 TABLET | Refills: 2 | Status: SHIPPED | OUTPATIENT
Start: 2022-04-09 | End: 2023-01-17

## 2022-05-16 ENCOUNTER — TELEPHONE (OUTPATIENT)
Dept: FAMILY MEDICINE CLINIC | Facility: CLINIC | Age: 72
End: 2022-05-16

## 2022-05-16 NOTE — TELEPHONE ENCOUNTER
"Subjective:       Patient ID: Neha Hamlin is a 40 y.o. male.    Chief Complaint:   No chief complaint on file.      HPI        #Interim Hx    None     #Concerns Today    Patient reports he noticed a bump on the anal verge.  He reports he has some bleeding with stools.  He occasionally gets hard bowel movement its.  He had more bleeding last week.  He does not have any fatigue symptoms, palpitations or PICA.  He notes no diarrhea, fevers at night.  He has not had any weight loss.    He notes continued neck pain symptoms as well.  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  He has no numbness or tingling in the arms.  He was sent for physical therapy but did not complete this.      #Chronic Problems        Health Maintenance Due   Topic Date Due    Hepatitis C Screening  1980    Influenza Vaccine (1) 08/01/2020     Health Maintenance Topics with due status: Not Due       Topic Last Completion Date    TETANUS VACCINE 05/27/2015    Lipid Panel 12/23/2019           Review of Systems   Constitutional: Negative for activity change, appetite change, fatigue and fever.   Respiratory: Negative for shortness of breath.    Cardiovascular: Negative for chest pain.   Gastrointestinal: Negative for abdominal pain.   Genitourinary: Negative for difficulty urinating.   Musculoskeletal: Positive for arthralgias.   Neurological: Negative for syncope and headaches.       Objective:   /74 (BP Location: Right arm, Patient Position: Sitting, BP Method: Medium (Manual))   Pulse 96   Temp 97.2 °F (36.2 °C) (Oral)   Resp 17   Ht 5' 5" (1.651 m)   Wt 91.4 kg (201 lb 8 oz)   SpO2 98%   BMI 33.53 kg/m²        Physical Exam  Vitals signs and nursing note reviewed.   Constitutional:       General: He is not in acute distress.     Appearance: He is well-developed. He is not diaphoretic.   HENT:      Head: Normocephalic.      Mouth/Throat:      Pharynx: No oropharyngeal exudate.   Eyes:      " ----- Message from Bernardino Medina MA sent at 5/16/2022 12:11 PM EDT -----  Regarding: FW: TENS use    ----- Message -----  From: Tanna Bull  Sent: 5/16/2022  12:09 PM EDT  To: [facility]  Subject: TENS use                                         Samy, reading the TENS literature and it says not to use if I ever had back surgery, which I did 30+ years ago, with no metal implants.  I wanted to use use the TENS  on my shoulder/knee/lower back/ankle.  Is it contridicated by the back surgeries? Thanks.  Tanna Bull      General:         Right eye: No discharge.         Left eye: No discharge.      Conjunctiva/sclera: Conjunctivae normal.      Pupils: Pupils are equal, round, and reactive to light.   Neck:      Thyroid: No thyromegaly.   Cardiovascular:      Rate and Rhythm: Normal rate and regular rhythm.      Heart sounds: Normal heart sounds. No murmur. No friction rub. No gallop.    Pulmonary:      Effort: Pulmonary effort is normal. No respiratory distress.      Breath sounds: No stridor. No wheezing or rales.   Chest:      Chest wall: No tenderness.   Abdominal:      General: There is no distension.      Palpations: Abdomen is soft.   Genitourinary:     Rectum: Guaiac result negative.      Comments: Internal hemrroid at 6 oclock  Musculoskeletal: Normal range of motion.         General: No tenderness or deformity.      Comments: Negative Spurling   Skin:     General: Skin is warm.   Neurological:      Mental Status: He is alert.      Cranial Nerves: No cranial nerve deficit.      Sensory: No sensory deficit.      Motor: No abnormal muscle tone.      Coordination: Coordination normal.      Deep Tendon Reflexes: Reflexes normal.             ASCVD  The 10-year ASCVD risk score (Happy MICEHLL Jr., et al., 2013) is: 1%    Values used to calculate the score:      Age: 40 years      Sex: Male      Is Non- : No      Diabetic: No      Tobacco smoker: No      Systolic Blood Pressure: 122 mmHg      Is BP treated: No      HDL Cholesterol: 53 mg/dL      Total Cholesterol: 182 mg/dL    Basic Labs  BMP  Lab Results   Component Value Date     12/23/2019    K 4.6 12/23/2019     12/23/2019    CO2 29 12/23/2019    BUN 12 12/23/2019    CREATININE 0.9 12/23/2019    CALCIUM 10.0 12/23/2019    ANIONGAP 7 (L) 12/23/2019    ESTGFRAFRICA >60.0 12/23/2019    EGFRNONAA >60.0 12/23/2019     Lab Results   Component Value Date    ALT 15 12/23/2019    AST 25 12/23/2019    ALKPHOS 65 12/23/2019    BILITOT 0.6 12/23/2019       Lab  Results   Component Value Date    TSH 1.917 05/27/2015         Lipids  Lab Results   Component Value Date    CHOL 182 12/23/2019     Lab Results   Component Value Date    HDL 53 12/23/2019     Lab Results   Component Value Date    LDLCALC 115.4 12/23/2019     Lab Results   Component Value Date    TRIG 68 12/23/2019     Lab Results   Component Value Date    CHOLHDL 29.1 12/23/2019         Assessment:       1. Preventative health care    2. Need for vaccination    3. Neck pain    4. Hemorrhoids, unspecified hemorrhoid type            Plan:           Neha was seen today for annual exam.    Diagnoses and all orders for this visit:    Preventative health care  -     Hemoglobin A1C; Future  -     Lipid Panel; Future  -     Hepatitis C Antibody; Future  -     Basic Metabolic Panel; Future  -     Hepatic Function Panel; Future    Need for vaccination  -- I reviewed the patient vaccine history and provided the risk benefits and side effects of the vaccine.   -- I provided the patient a VIS sheet on the vaccine.     -     Influenza - Quadrivalent (PF)    Neck pain  Chronic; stable  Still low suspicion for serious pathology   Possuible early OA  Will check XR   reviewed signs and symptoms that should prompt return to provider or evaluation in the ED  -     X-Ray Cervical Spine Complete 5 view; Future  -     diclofenac sodium (VOLTAREN) 1 % Gel; Apply 2 g topically 4 (four) times daily.    Hemorrhoids, unspecified hemorrhoid type  New;uncontrolled  Check for anemia  Will start anusol   reviewed signs and symptoms that should prompt return to provider or evaluation in the ED  -     CBC Auto Differential; Future  -     hydrocortisone (ANUSOL-HC) 25 mg suppository; Place 1 suppository (25 mg total) rectally 2 (two) times daily. for 10 days            No future appointments.      Medication List with Changes/Refills   New Medications    DICLOFENAC SODIUM (VOLTAREN) 1 % GEL    Apply 2 g topically 4 (four) times daily.     HYDROCORTISONE (ANUSOL-HC) 25 MG SUPPOSITORY    Place 1 suppository (25 mg total) rectally 2 (two) times daily. for 10 days   Discontinued Medications    DICLOFENAC SODIUM (VOLTAREN) 1 % GEL    Apply 2 g topically 4 (four) times daily.         Disclaimer:  This note has been generated using voice-recognition software. There may be grammatical or spelling errors that have been missed during proof-reading

## 2022-06-06 RX ORDER — ERGOCALCIFEROL 1.25 MG/1
CAPSULE ORAL
Qty: 12 CAPSULE | Refills: 2 | Status: SHIPPED | OUTPATIENT
Start: 2022-06-06 | End: 2023-02-13

## 2022-08-09 LAB
25(OH)D3+25(OH)D2 SERPL-MCNC: 52 NG/ML (ref 30–100)
ALBUMIN SERPL-MCNC: 4.4 G/DL (ref 3.7–4.7)
ALBUMIN/GLOB SERPL: 1.9 {RATIO} (ref 1.2–2.2)
ALP SERPL-CCNC: 115 IU/L (ref 44–121)
ALT SERPL-CCNC: 14 IU/L (ref 0–32)
AST SERPL-CCNC: 13 IU/L (ref 0–40)
BILIRUB SERPL-MCNC: 0.4 MG/DL (ref 0–1.2)
BUN SERPL-MCNC: 31 MG/DL (ref 8–27)
BUN/CREAT SERPL: 22 (ref 12–28)
CALCIUM SERPL-MCNC: 9.3 MG/DL (ref 8.7–10.3)
CHLORIDE SERPL-SCNC: 104 MMOL/L (ref 96–106)
CHOLEST SERPL-MCNC: 153 MG/DL (ref 100–199)
CO2 SERPL-SCNC: 24 MMOL/L (ref 20–29)
CREAT SERPL-MCNC: 1.41 MG/DL (ref 0.57–1)
EGFRCR SERPLBLD CKD-EPI 2021: 39 ML/MIN/1.73
GLOBULIN SER CALC-MCNC: 2.3 G/DL (ref 1.5–4.5)
GLUCOSE SERPL-MCNC: 97 MG/DL (ref 65–99)
HBA1C MFR BLD: 7 % (ref 4.8–5.6)
HDLC SERPL-MCNC: 40 MG/DL
LDLC SERPL CALC-MCNC: 92 MG/DL (ref 0–99)
POTASSIUM SERPL-SCNC: 4.6 MMOL/L (ref 3.5–5.2)
PROT SERPL-MCNC: 6.7 G/DL (ref 6–8.5)
SODIUM SERPL-SCNC: 141 MMOL/L (ref 134–144)
TRIGL SERPL-MCNC: 113 MG/DL (ref 0–149)
VLDLC SERPL CALC-MCNC: 21 MG/DL (ref 5–40)

## 2022-08-17 ENCOUNTER — OFFICE VISIT (OUTPATIENT)
Dept: FAMILY MEDICINE CLINIC | Facility: CLINIC | Age: 72
End: 2022-08-17

## 2022-08-17 VITALS
SYSTOLIC BLOOD PRESSURE: 154 MMHG | BODY MASS INDEX: 40.34 KG/M2 | RESPIRATION RATE: 16 BRPM | OXYGEN SATURATION: 97 % | TEMPERATURE: 97.8 F | WEIGHT: 251 LBS | DIASTOLIC BLOOD PRESSURE: 74 MMHG | HEIGHT: 66 IN | HEART RATE: 56 BPM

## 2022-08-17 DIAGNOSIS — Z79.4 TYPE 2 DIABETES MELLITUS WITH CHRONIC KIDNEY DISEASE, WITH LONG-TERM CURRENT USE OF INSULIN, UNSPECIFIED CKD STAGE: Primary | ICD-10-CM

## 2022-08-17 DIAGNOSIS — E11.22 TYPE 2 DIABETES MELLITUS WITH CHRONIC KIDNEY DISEASE, WITH LONG-TERM CURRENT USE OF INSULIN, UNSPECIFIED CKD STAGE: Primary | ICD-10-CM

## 2022-08-17 DIAGNOSIS — E55.9 VITAMIN D DEFICIENCY: ICD-10-CM

## 2022-08-17 DIAGNOSIS — I10 PRIMARY HYPERTENSION: ICD-10-CM

## 2022-08-17 DIAGNOSIS — E78.2 MIXED HYPERLIPIDEMIA: ICD-10-CM

## 2022-08-17 PROCEDURE — 99213 OFFICE O/P EST LOW 20 MIN: CPT | Performed by: FAMILY MEDICINE

## 2022-08-17 NOTE — PROGRESS NOTES
Subjective   Tanna Bull is a 71 y.o. female.     History of Present Illness     The patient presents today for follow-up on blood pressure, cholesterol, diabetes, and vitamin D deficiency.    Diabetes  The patient has lost a few pounds since her last visit. She reports she does not check her blood glucose daily, but she does check it more than once per week. She mentions her blood glucose levels are typically in the 90s to lower 100s mg/dL. She denies any chest pain or trouble breathing.      The following portions of the patient's history were reviewed and updated as appropriate: allergies, current medications, past family history, past medical history, past social history, past surgical history, and problem list.  Past Medical History:   Diagnosis Date   • Arthritis    • Cataract 2009   • Cholelithiasis ?   • Colon polyp 2009   • Depression     Mild    • DM2 (diabetes mellitus, type 2) (HCC)    • High cholesterol    • HTN (hypertension)    • IDDM (insulin dependent diabetes mellitus)    • Obesity      Past Surgical History:   Procedure Laterality Date   • BACK SURGERY     • BREAST BIOPSY Left     core bx (unsure when)   • BUNIONECTOMY     • CATARACT EXTRACTION     • CHOLECYSTECTOMY     • COLONOSCOPY N/A 9/1/2020    Procedure: COLONOSCOPY WITH POLYPECTOMY X4;  Surgeon: Jani Montoya MD;  Location: [facility] ENDOSCOPY;  Service: Gastroenterology;  Laterality: N/A;  diverticulosis, colon polyps   • ORIF ANKLE FRACTURE Left 2009     Family History   Problem Relation Age of Onset   • Diabetes Other    • Stroke Other    • Breast cancer Other    • Breast cancer Sister 60   • Asthma Sister    • Cancer Sister    • Diabetes Maternal Aunt      Social History     Socioeconomic History   • Marital status: Single   Tobacco Use   • Smoking status: Former Smoker     Packs/day: 0.50     Years: 15.00     Pack years: 7.50     Types: Cigarettes     Start date: 6/1/1967     Quit date: 1/1/1995     Years since quitting:  "27.6   • Smokeless tobacco: Never Used   Vaping Use   • Vaping Use: Never used   Substance and Sexual Activity   • Alcohol use: Yes     Alcohol/week: 0.0 standard drinks     Comment: Once a month   • Drug use: No   • Sexual activity: Not Currently         Current Outpatient Medications:   •  buPROPion XL (WELLBUTRIN XL) 300 MG 24 hr tablet, TAKE 1 TABLET DAILY, Disp: 90 tablet, Rfl: 2  •  furosemide (LASIX) 20 MG tablet, Take 1 tablet by mouth Daily., Disp: 90 tablet, Rfl: 3  •  Glucosamine-Chondroit-Collagen 125-100-10 MG tablet, [facility] GLUCO-CHONDROIT PLUS UC--100-10 MG TABS, Disp: , Rfl:   •  HYDROcodone-acetaminophen (Norco) 5-325 MG per tablet, Take 1 tablet by mouth Every 6 (Six) Hours As Needed for Moderate Pain ., Disp: 10 tablet, Rfl: 0  •  insulin aspart prot & aspart (NovoLOG Mix 70/30 FlexPen) (70-30) 100 UNIT/ML suspension pen-injector injection, Inject 0.5 mL under the skin into the appropriate area as directed 2 (two) times a day., Disp: 90 mL, Rfl: 3  •  losartan (COZAAR) 50 MG tablet, Take 1 tablet by mouth Daily., Disp: , Rfl:   •  nebivolol (BYSTOLIC) 10 MG tablet, TAKE 1 TABLET DAILY, Disp: 90 tablet, Rfl: 2  •  simvastatin (ZOCOR) 40 MG tablet, TAKE 1 TABLET DAILY, Disp: 90 tablet, Rfl: 2  •  glucose blood test strip, One Touch Verio Test Strips- Use to check blood sugar three times a day. Dx: e11.65, Disp: 300 each, Rfl: 3  •  Insulin Pen Needle (COMFORT EZ PEN NEEDLES) 31G X 5 MM misc, COMFORT EZ PEN NEEDLES 31G X 5 MM, Disp: , Rfl:   •  vitamin D (ERGOCALCIFEROL) 1.25 MG (75266 UT) capsule capsule, TAKE 1 CAPSULE WEEKLY, Disp: 12 capsule, Rfl: 2    Review of Systems  /74 (BP Location: Left arm, Patient Position: Sitting, Cuff Size: Large Adult)   Pulse 56   Temp 97.8 °F (36.6 °C) (Temporal)   Resp 16   Ht 167.6 cm (66\")   Wt 114 kg (251 lb)   SpO2 97%   BMI 40.51 kg/m²     A review of systems was performed, and the pertinent positives are noted in the HPI.      Objective "   Physical Exam  Vitals and nursing note reviewed.   Constitutional:       Appearance: She is well-developed and well-groomed.      Comments: She is morbidly obese. She is in no acute distress. She is well groomed. She is alert and cooperative with exam.   HENT:      Head: Normocephalic and atraumatic.   Cardiovascular:      Rate and Rhythm: Normal rate and regular rhythm.      Heart sounds: Normal heart sounds.      Comments: Heart has regular rate and rhythm without murmur.   Pulmonary:      Effort: Pulmonary effort is normal. No respiratory distress.      Breath sounds: Normal breath sounds.      Comments: Lungs are clear to auscultation bilaterally.  Musculoskeletal:      Cervical back: Normal range of motion and neck supple.      Comments:  No pedal edema.   Lymphadenopathy:      Cervical: No cervical adenopathy.   Skin:     General: Skin is warm.   Neurological:      Mental Status: She is alert.   Psychiatric:         Behavior: Behavior normal. Behavior is cooperative.         Lab Results   Component Value Date    GLUCOSE 97 08/08/2022    BUN 31 (H) 08/08/2022    CREATININE 1.41 (H) 08/08/2022    EGFRIFNONA 43 (L) 02/07/2022    EGFRIFAFRI 49 (L) 02/07/2022    BCR 22 08/08/2022    K 4.6 08/08/2022    CO2 24 08/08/2022    CALCIUM 9.3 08/08/2022    PROTENTOTREF 6.7 08/08/2022    ALBUMIN 4.4 08/08/2022    LABIL2 1.9 08/08/2022    AST 13 08/08/2022    ALT 14 08/08/2022     Lab Results   Component Value Date    CHLPL 153 08/08/2022    TRIG 113 08/08/2022    HDL 40 08/08/2022    LDL 92 08/08/2022     Lab Results   Component Value Date    HGBA1C 7.0 (H) 08/08/2022       Assessment & Plan   Problems Addressed this Visit        Cardiac and Vasculature    Mixed hyperlipidemia    Relevant Orders    Comprehensive Metabolic Panel    Lipid Panel    Primary hypertension    Relevant Orders    Comprehensive Metabolic Panel    Lipid Panel       Endocrine and Metabolic    Vitamin D deficiency    Relevant Orders    Vitamin D 25  Hydroxy    Type 2 diabetes mellitus with diabetic chronic kidney disease (HCC) - Primary    Relevant Orders    Comprehensive Metabolic Panel    Lipid Panel    Hemoglobin A1c      Diagnoses       Codes Comments    Type 2 diabetes mellitus with chronic kidney disease, with long-term current use of insulin, unspecified CKD stage (HCC)    -  Primary ICD-10-CM: E11.22, Z79.4  ICD-9-CM: 250.40, 585.9, V58.67     Vitamin D deficiency     ICD-10-CM: E55.9  ICD-9-CM: 268.9     Mixed hyperlipidemia     ICD-10-CM: E78.2  ICD-9-CM: 272.2     Primary hypertension     ICD-10-CM: I10  ICD-9-CM: 401.9         1. Hypertension.  She will continue her current medications of losartan 50 mg and Bystolic 10 mg.    2. Hyperlipidemia.  She will continue simvastatin 40 mg.    3. Diabetes.  She will continue her insulin.    4. Vitamin D deficiency.  She will continue her vitamin D 50,000 units weekly. I reviewed her labs with her and I will see her back in 6 months. She was counseled on diet. She was counseled on the need for weight loss and dietary compliance. She was encouraged to get her flu shot this fall.    Labs were all reviewed with her                Transcribed from ambient dictation for Karina Arvizu MD by MAGDA SALCIDO.  08/17/22   10:23 EDT    Patient verbalized consent to the visit recording.

## 2022-08-17 NOTE — PATIENT INSTRUCTIONS
Keep working to lose weight through healthy eating and exercise.   No fried foods and limit pasta, bread, and sweets.  Get a flu shot this Fall

## 2022-10-10 RX ORDER — SIMVASTATIN 40 MG
TABLET ORAL
Qty: 90 TABLET | Refills: 2 | Status: SHIPPED | OUTPATIENT
Start: 2022-10-10

## 2022-10-10 RX ORDER — BUPROPION HYDROCHLORIDE 300 MG/1
TABLET ORAL
Qty: 90 TABLET | Refills: 2 | Status: SHIPPED | OUTPATIENT
Start: 2022-10-10

## 2022-10-23 RX ORDER — INSULIN ASPART 100 [IU]/ML
INJECTION, SUSPENSION SUBCUTANEOUS
Qty: 90 ML | Refills: 0 | Status: SHIPPED | OUTPATIENT
Start: 2022-10-23 | End: 2023-01-17

## 2022-10-31 ENCOUNTER — TELEPHONE (OUTPATIENT)
Dept: FAMILY MEDICINE CLINIC | Facility: CLINIC | Age: 72
End: 2022-10-31

## 2023-01-17 RX ORDER — NEBIVOLOL 10 MG/1
TABLET ORAL
Qty: 90 TABLET | Refills: 0 | Status: SHIPPED | OUTPATIENT
Start: 2023-01-17

## 2023-01-17 RX ORDER — INSULIN ASPART 100 [IU]/ML
INJECTION, SUSPENSION SUBCUTANEOUS
Qty: 90 ML | Refills: 0 | Status: SHIPPED | OUTPATIENT
Start: 2023-01-17

## 2023-02-13 RX ORDER — ERGOCALCIFEROL 1.25 MG/1
CAPSULE ORAL
Qty: 12 CAPSULE | Refills: 1 | Status: SHIPPED | OUTPATIENT
Start: 2023-02-13

## 2023-02-15 LAB
25(OH)D3+25(OH)D2 SERPL-MCNC: 47.2 NG/ML (ref 30–100)
ALBUMIN SERPL-MCNC: 4.2 G/DL (ref 3.7–4.7)
ALBUMIN/GLOB SERPL: 1.7 {RATIO} (ref 1.2–2.2)
ALP SERPL-CCNC: 108 IU/L (ref 44–121)
ALT SERPL-CCNC: 10 IU/L (ref 0–32)
AMBIG ABBREV CMP14 DEFAULT: NORMAL
AMBIG ABBREV LP DEFAULT: NORMAL
AST SERPL-CCNC: 12 IU/L (ref 0–40)
BILIRUB SERPL-MCNC: 0.5 MG/DL (ref 0–1.2)
BUN SERPL-MCNC: 26 MG/DL (ref 8–27)
BUN/CREAT SERPL: 18 (ref 12–28)
CALCIUM SERPL-MCNC: 9.2 MG/DL (ref 8.7–10.3)
CHLORIDE SERPL-SCNC: 101 MMOL/L (ref 96–106)
CHOLEST SERPL-MCNC: 155 MG/DL (ref 100–199)
CO2 SERPL-SCNC: 25 MMOL/L (ref 20–29)
CREAT SERPL-MCNC: 1.42 MG/DL (ref 0.57–1)
EGFRCR SERPLBLD CKD-EPI 2021: 39 ML/MIN/1.73
GLOBULIN SER CALC-MCNC: 2.5 G/DL (ref 1.5–4.5)
GLUCOSE SERPL-MCNC: 225 MG/DL (ref 70–99)
HBA1C MFR BLD: 6.9 % (ref 4.8–5.6)
HDLC SERPL-MCNC: 35 MG/DL
LDLC SERPL CALC-MCNC: 92 MG/DL (ref 0–99)
POTASSIUM SERPL-SCNC: 5 MMOL/L (ref 3.5–5.2)
PROT SERPL-MCNC: 6.7 G/DL (ref 6–8.5)
SODIUM SERPL-SCNC: 139 MMOL/L (ref 134–144)
TRIGL SERPL-MCNC: 157 MG/DL (ref 0–149)
VLDLC SERPL CALC-MCNC: 28 MG/DL (ref 5–40)

## 2023-02-21 ENCOUNTER — OFFICE VISIT (OUTPATIENT)
Dept: FAMILY MEDICINE CLINIC | Facility: CLINIC | Age: 73
End: 2023-02-21
Payer: COMMERCIAL

## 2023-02-21 VITALS
HEART RATE: 54 BPM | HEIGHT: 66 IN | WEIGHT: 250 LBS | BODY MASS INDEX: 40.18 KG/M2 | TEMPERATURE: 98 F | DIASTOLIC BLOOD PRESSURE: 75 MMHG | OXYGEN SATURATION: 96 % | SYSTOLIC BLOOD PRESSURE: 144 MMHG

## 2023-02-21 DIAGNOSIS — M20.41 HAMMERTOE OF RIGHT FOOT: ICD-10-CM

## 2023-02-21 DIAGNOSIS — E55.9 VITAMIN D DEFICIENCY: ICD-10-CM

## 2023-02-21 DIAGNOSIS — M79.674 PAIN OF TOE OF RIGHT FOOT: ICD-10-CM

## 2023-02-21 DIAGNOSIS — I10 PRIMARY HYPERTENSION: ICD-10-CM

## 2023-02-21 DIAGNOSIS — E11.22 TYPE 2 DIABETES MELLITUS WITH CHRONIC KIDNEY DISEASE, WITH LONG-TERM CURRENT USE OF INSULIN, UNSPECIFIED CKD STAGE: ICD-10-CM

## 2023-02-21 DIAGNOSIS — E78.2 MIXED HYPERLIPIDEMIA: Primary | ICD-10-CM

## 2023-02-21 DIAGNOSIS — Z79.4 TYPE 2 DIABETES MELLITUS WITH CHRONIC KIDNEY DISEASE, WITH LONG-TERM CURRENT USE OF INSULIN, UNSPECIFIED CKD STAGE: ICD-10-CM

## 2023-02-21 PROBLEM — H26.491 PCO (POSTERIOR CAPSULAR OPACIFICATION), RIGHT: Status: ACTIVE | Noted: 2022-02-01

## 2023-02-21 PROCEDURE — 99213 OFFICE O/P EST LOW 20 MIN: CPT | Performed by: FAMILY MEDICINE

## 2023-02-21 NOTE — PROGRESS NOTES
Subjective   Tanna Bull is a 72 y.o. female.     History of Present Illness  Here for follow-up on her blood pressure, cholesterol, diabetes, vitamin D  Levels were done prior to her appointment       The patient presents today for follow-up on her blood pressure, cholesterol, diabetes, and vitamin D. Her levels were done prior to her appointment and discussed with her. She is also complaining of cough, productive of greenish phlegm for the last 4 to 5 months.    Ms. Bull states she is doing well. She has been monitoring her blood glucose levels at home, stating they have been fluctuating. She states her blood glucose levels have been as low as 88 mg/dL and as high as 137 mg/dL. She states she forgot to take her insulin the night before her labs were drawn. Denies chest pain or shortness of breath.    She reports episodes where she coughs continuously. The patient has been producing green phlegm for the last 4 to 5 months. Denies being on antibiotics.    The patient states she has a hammertoe and it is painful to wear shoes. She is requesting a referral.     The following portions of the patient's history were reviewed and updated as appropriate: allergies, current medications, past family history, past medical history, past social history, past surgical history, and problem list.  Past Medical History:   Diagnosis Date   • Arthritis    • Cataract 2009   • Cholelithiasis ?   • Colon polyp 2009   • Depression     Mild    • DM2 (diabetes mellitus, type 2) (HCC)    • High cholesterol    • HTN (hypertension)    • IDDM (insulin dependent diabetes mellitus)    • Obesity      Past Surgical History:   Procedure Laterality Date   • BACK SURGERY     • BREAST BIOPSY Left     core bx (unsure when)   • BUNIONECTOMY     • CATARACT EXTRACTION     • CHOLECYSTECTOMY     • COLONOSCOPY N/A 09/01/2020    Procedure: COLONOSCOPY WITH POLYPECTOMY X4;  Surgeon: Jani Montoya MD;  Location: [facility] ENDOSCOPY;  Service:  Gastroenterology;  Laterality: N/A;  diverticulosis, colon polyps   • FRACTURE SURGERY     • ORIF ANKLE FRACTURE Left 2009     Family History   Problem Relation Age of Onset   • Diabetes Other    • Stroke Other    • Breast cancer Other    • Breast cancer Sister 60   • Asthma Sister    • Cancer Sister    • Diabetes Maternal Aunt      Social History     Socioeconomic History   • Marital status: Single   Tobacco Use   • Smoking status: Former     Packs/day: 0.50     Years: 15.00     Pack years: 7.50     Types: Cigarettes     Start date: 1967     Quit date: 1995     Years since quittin.1   • Smokeless tobacco: Never   Vaping Use   • Vaping Use: Never used   Substance and Sexual Activity   • Alcohol use: Yes     Comment: Once a month   • Drug use: Never   • Sexual activity: Not Currently         Current Outpatient Medications:   •  buPROPion XL (WELLBUTRIN XL) 300 MG 24 hr tablet, TAKE 1 TABLET DAILY, Disp: 90 tablet, Rfl: 2  •  furosemide (LASIX) 20 MG tablet, Take 1 tablet by mouth Daily., Disp: 90 tablet, Rfl: 3  •  Glucosamine-Chondroit-Collagen 125-100-10 MG tablet, [facility] GLUCO-CHONDROIT PLUS UC--100-10 MG TABS, Disp: , Rfl:   •  glucose blood test strip, One Touch Verio Test Strips- Use to check blood sugar three times a day. Dx: e11.65, Disp: 300 each, Rfl: 3  •  HYDROcodone-acetaminophen (Norco) 5-325 MG per tablet, Take 1 tablet by mouth Every 6 (Six) Hours As Needed for Moderate Pain ., Disp: 10 tablet, Rfl: 0  •  Insulin Pen Needle 31G X 5 MM misc, COMFORT EZ PEN NEEDLES 31G X 5 MM, Disp: , Rfl:   •  losartan (COZAAR) 50 MG tablet, Take 1 tablet by mouth Daily., Disp: , Rfl:   •  nebivolol (BYSTOLIC) 10 MG tablet, TAKE 1 TABLET DAILY, Disp: 90 tablet, Rfl: 0  •  NovoLOG Mix 70/30 FlexPen (70-30) 100 UNIT/ML suspension pen-injector injection, INJECT 0.5ML UNDER THE SKININTO THE APPROPRIATE AREA  TWO TIMES A DAY AS DIRECTED, Disp: 90 mL, Rfl: 0  •  simvastatin (ZOCOR) 40 MG tablet, TAKE 1  "TABLET DAILY, Disp: 90 tablet, Rfl: 2  •  vitamin D (ERGOCALCIFEROL) 1.25 MG (78315 UT) capsule capsule, TAKE 1 CAPSULE WEEKLY, Disp: 12 capsule, Rfl: 1    Review of Systems   ROS done and noted in HPI  /75 (BP Location: Left arm, Patient Position: Sitting, Cuff Size: Large Adult)   Pulse 54   Temp 98 °F (36.7 °C) (Temporal)   Ht 167.6 cm (66\")   Wt 113 kg (250 lb)   SpO2 96%   BMI 40.35 kg/m²       Objective   Physical Exam  Vitals and nursing note reviewed.   Constitutional:       General: She is not in acute distress.     Appearance: Normal appearance. She is well-developed and well-groomed. She is morbidly obese.   HENT:      Head: Normocephalic and atraumatic.   Neck:      Thyroid: No thyromegaly.   Cardiovascular:      Rate and Rhythm: Normal rate and regular rhythm.      Heart sounds: Normal heart sounds. No murmur heard.    No friction rub. No gallop.   Pulmonary:      Effort: Pulmonary effort is normal. No respiratory distress.      Breath sounds: Normal breath sounds. No wheezing or rales.   Musculoskeletal:      Cervical back: Neck supple.   Feet:      Right foot:      Skin integrity: Callus and dry skin present.      Comments: Hammertoe, overgrown nail, thickened nail right second toe  Lymphadenopathy:      Cervical: No cervical adenopathy.   Skin:     General: Skin is warm and dry.   Neurological:      Mental Status: She is alert.   Psychiatric:         Behavior: Behavior is cooperative.       Lab Results   Component Value Date    GLUCOSE 225 (H) 02/14/2023    BUN 26 02/14/2023    CREATININE 1.42 (H) 02/14/2023    EGFRRESULT 39 (L) 02/14/2023    BCR 18 02/14/2023    K 5.0 02/14/2023    CO2 25 02/14/2023    CALCIUM 9.2 02/14/2023    PROTENTOTREF 6.7 02/14/2023    ALBUMIN 4.2 02/14/2023    LABIL2 1.7 02/14/2023    AST 12 02/14/2023    ALT 10 02/14/2023     Lab Results   Component Value Date    CHLPL 155 02/14/2023    TRIG 157 (H) 02/14/2023    HDL 35 (L) 02/14/2023    LDL 92 02/14/2023     Lab " Results   Component Value Date    HGBA1C 6.9 (H) 02/14/2023         Assessment & Plan    Problems Addressed this Visit        Cardiac and Vasculature    Mixed hyperlipidemia - Primary    Relevant Orders    Comprehensive Metabolic Panel    Lipid Panel    Primary hypertension    Relevant Orders    Comprehensive Metabolic Panel    Lipid Panel       Endocrine and Metabolic    Type 2 diabetes mellitus with diabetic chronic kidney disease (HCC)    Relevant Orders    Comprehensive Metabolic Panel    Lipid Panel    Hemoglobin A1c    Vitamin D deficiency   Other Visit Diagnoses     Hammertoe of right foot        Relevant Orders    Ambulatory Referral to Podiatry    Pain of toe of right foot        Relevant Orders    Ambulatory Referral to Podiatry      Diagnoses       Codes Comments    Mixed hyperlipidemia    -  Primary ICD-10-CM: E78.2  ICD-9-CM: 272.2     Primary hypertension     ICD-10-CM: I10  ICD-9-CM: 401.9     Type 2 diabetes mellitus with chronic kidney disease, with long-term current use of insulin, unspecified CKD stage (HCC)     ICD-10-CM: E11.22, Z79.4  ICD-9-CM: 250.40, 585.9, V58.67     Vitamin D deficiency     ICD-10-CM: E55.9  ICD-9-CM: 268.9     Hammertoe of right foot     ICD-10-CM: M20.41  ICD-9-CM: 735.4     Pain of toe of right foot     ICD-10-CM: M79.674  ICD-9-CM: 729.5         1. Hyperlipidemia  - Labs have been reviewed.  - She was counseled on the need for exercise.  - She will continue the simvastatin 40 mg.    2. Hypertension  - She will continue losartan 50 mg and Bystolic 10 mg.    3. Type 2 diabetes mellitus  - A1c was less than 7.  - She will continue her NovoLog 70/30 mix.    4. Vitamin D deficiency  - She will continue the prescription vitamin D.    5. Hammertoe and painful toe  - I will get her in to see a podiatrist.  - She was counseled on the need for weight loss.    I will see her back in 6 months for follow-up and Medicare wellness.      Transcribed from ambient dictation for Karina  ARA Arvizu MD by Wilfredo Croft.  02/21/23   12:26 EST    Patient or patient representative verbalized consent to the visit recording.  I have personally performed the services described in this document as transcribed by the above individual, and it is both accurate and complete.

## 2023-04-16 RX ORDER — INSULIN ASPART 100 [IU]/ML
INJECTION, SUSPENSION SUBCUTANEOUS
Qty: 90 ML | Refills: 0 | Status: SHIPPED | OUTPATIENT
Start: 2023-04-16

## 2023-04-16 RX ORDER — NEBIVOLOL 10 MG/1
TABLET ORAL
Qty: 90 TABLET | Refills: 0 | Status: SHIPPED | OUTPATIENT
Start: 2023-04-16

## 2023-04-30 RX ORDER — SIMVASTATIN 40 MG
TABLET ORAL
Qty: 90 TABLET | Refills: 2 | Status: SHIPPED | OUTPATIENT
Start: 2023-04-30

## 2023-08-09 RX ORDER — ERGOCALCIFEROL 1.25 MG/1
CAPSULE ORAL
Qty: 12 CAPSULE | Refills: 1 | Status: SHIPPED | OUTPATIENT
Start: 2023-08-09

## 2023-08-09 RX ORDER — BUPROPION HYDROCHLORIDE 300 MG/1
TABLET ORAL
Qty: 90 TABLET | Refills: 1 | Status: SHIPPED | OUTPATIENT
Start: 2023-08-09

## 2023-08-09 RX ORDER — INSULIN ASPART 100 [IU]/ML
INJECTION, SUSPENSION SUBCUTANEOUS
Qty: 90 ML | Refills: 1 | Status: SHIPPED | OUTPATIENT
Start: 2023-08-09

## 2023-08-09 RX ORDER — NEBIVOLOL 10 MG/1
TABLET ORAL
Qty: 90 TABLET | Refills: 1 | Status: SHIPPED | OUTPATIENT
Start: 2023-08-09

## 2023-08-23 LAB
ALBUMIN SERPL-MCNC: 3.9 G/DL (ref 3.8–4.8)
ALBUMIN/GLOB SERPL: 1.7 {RATIO} (ref 1.2–2.2)
ALP SERPL-CCNC: 103 IU/L (ref 44–121)
ALT SERPL-CCNC: 11 IU/L (ref 0–32)
AMBIG ABBREV CMP14 DEFAULT: NORMAL
AMBIG ABBREV LP DEFAULT: NORMAL
AST SERPL-CCNC: 11 IU/L (ref 0–40)
BILIRUB SERPL-MCNC: 0.5 MG/DL (ref 0–1.2)
BUN SERPL-MCNC: 27 MG/DL (ref 8–27)
BUN/CREAT SERPL: 20 (ref 12–28)
CALCIUM SERPL-MCNC: 9.1 MG/DL (ref 8.7–10.3)
CHLORIDE SERPL-SCNC: 103 MMOL/L (ref 96–106)
CHOLEST SERPL-MCNC: 137 MG/DL (ref 100–199)
CO2 SERPL-SCNC: 24 MMOL/L (ref 20–29)
CREAT SERPL-MCNC: 1.35 MG/DL (ref 0.57–1)
EGFRCR SERPLBLD CKD-EPI 2021: 41 ML/MIN/1.73
GLOBULIN SER CALC-MCNC: 2.3 G/DL (ref 1.5–4.5)
GLUCOSE SERPL-MCNC: 146 MG/DL (ref 70–99)
HBA1C MFR BLD: 6.8 % (ref 4.8–5.6)
HDLC SERPL-MCNC: 39 MG/DL
LDLC SERPL CALC-MCNC: 73 MG/DL (ref 0–99)
POTASSIUM SERPL-SCNC: 4.7 MMOL/L (ref 3.5–5.2)
PROT SERPL-MCNC: 6.2 G/DL (ref 6–8.5)
SODIUM SERPL-SCNC: 139 MMOL/L (ref 134–144)
TRIGL SERPL-MCNC: 140 MG/DL (ref 0–149)
VLDLC SERPL CALC-MCNC: 25 MG/DL (ref 5–40)

## 2023-09-05 ENCOUNTER — OFFICE VISIT (OUTPATIENT)
Dept: FAMILY MEDICINE CLINIC | Facility: CLINIC | Age: 73
End: 2023-09-05
Payer: COMMERCIAL

## 2023-09-05 ENCOUNTER — TELEPHONE (OUTPATIENT)
Dept: FAMILY MEDICINE CLINIC | Facility: CLINIC | Age: 73
End: 2023-09-05

## 2023-09-05 VITALS
HEART RATE: 45 BPM | DIASTOLIC BLOOD PRESSURE: 86 MMHG | HEIGHT: 66 IN | BODY MASS INDEX: 40.82 KG/M2 | SYSTOLIC BLOOD PRESSURE: 146 MMHG | WEIGHT: 254 LBS | TEMPERATURE: 98.6 F | OXYGEN SATURATION: 97 %

## 2023-09-05 DIAGNOSIS — E11.22 TYPE 2 DIABETES MELLITUS WITH CHRONIC KIDNEY DISEASE, WITH LONG-TERM CURRENT USE OF INSULIN, UNSPECIFIED CKD STAGE: ICD-10-CM

## 2023-09-05 DIAGNOSIS — M25.561 ACUTE PAIN OF RIGHT KNEE: ICD-10-CM

## 2023-09-05 DIAGNOSIS — Z79.4 TYPE 2 DIABETES MELLITUS WITH CHRONIC KIDNEY DISEASE, WITH LONG-TERM CURRENT USE OF INSULIN, UNSPECIFIED CKD STAGE: ICD-10-CM

## 2023-09-05 DIAGNOSIS — I10 PRIMARY HYPERTENSION: ICD-10-CM

## 2023-09-05 DIAGNOSIS — E55.9 VITAMIN D DEFICIENCY: ICD-10-CM

## 2023-09-05 DIAGNOSIS — E78.2 MIXED HYPERLIPIDEMIA: Primary | ICD-10-CM

## 2023-09-05 PROBLEM — I12.9 BENIGN HYPERTENSION WITH CHRONIC KIDNEY DISEASE: Status: ACTIVE | Noted: 2023-02-27

## 2023-09-05 PROCEDURE — 99213 OFFICE O/P EST LOW 20 MIN: CPT | Performed by: FAMILY MEDICINE

## 2023-09-05 RX ORDER — OMEGA-3/DHA/EPA/FISH OIL 300-1000MG
1 CAPSULE ORAL DAILY
COMMUNITY

## 2023-09-05 RX ORDER — ASPIRIN 81 MG/1
81 TABLET ORAL
COMMUNITY

## 2023-09-05 RX ORDER — VIT C/B6/B5/MAGNESIUM/HERB 173 50-5-6-5MG
CAPSULE ORAL
COMMUNITY
Start: 2023-07-01

## 2023-09-05 NOTE — PROGRESS NOTES
Subjective   Tanna Bull is a 72 y.o. female.     History of Present Illness       Ms. Tanna Bull presents today for follow-up on her blood pressure, cholesterol, and diabetes.      The patient states her blood glucose at home has been 131 mg/dL. She states one occasion it was 64 mg/dL.    The patient complains of bilateral knee pain, the right is worse than left. She states she tripped going up the basement stairs 3 to 4 weeks ago and hit her right knee hard. She states it does not feel like arthritis pain. She denies any swelling.    The patient denies any chest pain or trouble breathing. She denies needing medication refills    The following portions of the patient's history were reviewed and updated as appropriate: allergies, current medications, past family history, past medical history, past social history, past surgical history, and problem list.  Past Medical History:   Diagnosis Date    Arthritis     Cataract 2009    Cholelithiasis ?    Colon polyp 2009    Depression     Mild     DM2 (diabetes mellitus, type 2)     High cholesterol     HTN (hypertension)     IDDM (insulin dependent diabetes mellitus)     Obesity      Past Surgical History:   Procedure Laterality Date    BACK SURGERY      BREAST BIOPSY Left     core bx (unsure when)    BUNIONECTOMY      CATARACT EXTRACTION      CHOLECYSTECTOMY      COLONOSCOPY N/A 09/01/2020    Procedure: COLONOSCOPY WITH POLYPECTOMY X4;  Surgeon: Jani Montoya MD;  Location: [facility] ENDOSCOPY;  Service: Gastroenterology;  Laterality: N/A;  diverticulosis, colon polyps    FRACTURE SURGERY  2008    ORIF ANKLE FRACTURE Left 2009     Family History   Problem Relation Age of Onset    Diabetes Other     Stroke Other     Breast cancer Other     Breast cancer Sister 60    Asthma Sister     Cancer Sister     Diabetes Maternal Aunt      Social History     Socioeconomic History    Marital status: Single   Tobacco Use    Smoking status: Former     Packs/day: 0.50      Years: 15.00     Pack years: 7.50     Types: Cigarettes     Start date: 1967     Quit date: 1995     Years since quittin.6    Smokeless tobacco: Never   Vaping Use    Vaping Use: Never used   Substance and Sexual Activity    Alcohol use: Yes     Comment: Once a month    Drug use: Never    Sexual activity: Not Currently         Current Outpatient Medications:     aspirin 81 MG EC tablet, Take 1 tablet by mouth., Disp: , Rfl:     buPROPion XL (WELLBUTRIN XL) 300 MG 24 hr tablet, TAKE 1 TABLET DAILY, Disp: 90 tablet, Rfl: 1    fish oil-omega-3 fatty acids 1000 MG capsule, Take 1 capsule by mouth Daily., Disp: , Rfl:     furosemide (LASIX) 20 MG tablet, Take 1 tablet by mouth Daily., Disp: 90 tablet, Rfl: 3    Glucosamine-Chondroit-Collagen 125-100-10 MG tablet, [facility] GLUCO-CHONDROIT PLUS UC--100-10 MG TABS, Disp: , Rfl:     glucose blood test strip, One Touch Verio Test Strips- Use to check blood sugar three times a day. Dx: e11.65, Disp: 300 each, Rfl: 3    HYDROcodone-acetaminophen (Norco) 5-325 MG per tablet, Take 1 tablet by mouth Every 6 (Six) Hours As Needed for Moderate Pain ., Disp: 10 tablet, Rfl: 0    insulin aspart prot & aspart (NovoLOG Mix 70/30 FlexPen) (70-30) 100 UNIT/ML suspension pen-injector injection, INJECT 0.5ML UNDER THE SKININTO THE APPROPRIATE AREA  TWO TIMES A DAY AS DIRECTED, Disp: 90 mL, Rfl: 1    Insulin Pen Needle 31G X 5 MM misc, COMFORT EZ PEN NEEDLES 31G X 5 MM, Disp: , Rfl:     losartan (COZAAR) 50 MG tablet, Take 1 tablet by mouth Daily., Disp: , Rfl:     nebivolol (BYSTOLIC) 10 MG tablet, TAKE 1 TABLET DAILY, Disp: 90 tablet, Rfl: 1    simvastatin (ZOCOR) 40 MG tablet, TAKE 1 TABLET DAILY, Disp: 90 tablet, Rfl: 2    Turmeric 500 MG capsule, , Disp: , Rfl:     vitamin D (ERGOCALCIFEROL) 1.25 MG (50690 UT) capsule capsule, TAKE 1 CAPSULE WEEKLY, Disp: 12 capsule, Rfl: 1    Review of Systems  /86 (BP Location: Right arm, Patient Position: Sitting, Cuff Size: Large  "Adult)   Pulse (!) 45   Temp 98.6 °F (37 °C) (Temporal)   Ht 167.6 cm (66\")   Wt 115 kg (254 lb)   SpO2 97%   BMI 41.00 kg/m²     A review of systems was performed, and the pertinent positives are noted in the HPI.     Objective   Physical Exam  Vitals and nursing note reviewed.   Constitutional:       Appearance: Normal appearance. She is well-developed and well-groomed. She is morbidly obese.   Cardiovascular:      Rate and Rhythm: Normal rate and regular rhythm.      Heart sounds: Normal heart sounds.   Pulmonary:      Effort: Pulmonary effort is normal.      Breath sounds: Normal breath sounds.   Musculoskeletal:      Cervical back: Neck supple.      Right knee: No swelling, erythema or ecchymosis. Decreased range of motion. No tenderness. No LCL laxity, MCL laxity or ACL laxity.      Instability Tests: Anterior drawer test negative. Posterior drawer test negative.      Right lower leg: Edema present.      Left lower leg: Edema present.   Neurological:      Mental Status: She is alert.   Psychiatric:         Behavior: Behavior is cooperative.     Lab Results   Component Value Date    GLUCOSE 146 (H) 08/22/2023    BUN 27 08/22/2023    CREATININE 1.35 (H) 08/22/2023    EGFRRESULT 41 (L) 08/22/2023    BCR 20 08/22/2023    K 4.7 08/22/2023    CO2 24 08/22/2023    CALCIUM 9.1 08/22/2023    PROTENTOTREF 6.2 08/22/2023    ALBUMIN 3.9 08/22/2023    BILITOT 0.5 08/22/2023    AST 11 08/22/2023    ALT 11 08/22/2023     Lab Results   Component Value Date    CHLPL 137 08/22/2023    TRIG 140 08/22/2023    HDL 39 (L) 08/22/2023    LDL 73 08/22/2023     Lab Results   Component Value Date    HGBA1C 6.8 (H) 08/22/2023     25 Hydroxy, Vitamin D  30.0 - 100.0 ng/mL 47.2       Assessment & Plan   Problems Addressed this Visit          Cardiac and Vasculature    Mixed hyperlipidemia - Primary    Relevant Orders    Comprehensive Metabolic Panel    Lipid Panel    Primary hypertension    Relevant Orders    Comprehensive Metabolic " Panel    Lipid Panel       Endocrine and Metabolic    Vitamin D deficiency    Relevant Orders    Vitamin D,25-Hydroxy    Type 2 diabetes mellitus with diabetic chronic kidney disease    Relevant Orders    Comprehensive Metabolic Panel    Lipid Panel    Hemoglobin A1c     Other Visit Diagnoses       Acute pain of right knee        Relevant Orders    XR Knee 1 or 2 View Right          Diagnoses         Codes Comments    Mixed hyperlipidemia    -  Primary ICD-10-CM: E78.2  ICD-9-CM: 272.2     Primary hypertension     ICD-10-CM: I10  ICD-9-CM: 401.9     Type 2 diabetes mellitus with chronic kidney disease, with long-term current use of insulin, unspecified CKD stage     ICD-10-CM: E11.22, Z79.4  ICD-9-CM: 250.40, 585.9, V58.67     Vitamin D deficiency     ICD-10-CM: E55.9  ICD-9-CM: 268.9     Acute pain of right knee     ICD-10-CM: M25.561  ICD-9-CM: 719.46           1. Mixed hyperlipidemia  - She will continue her current medication.  - Her labs were all reviewed with her. There has been improvement.  - She was counseled on the need for dietary compliance and weight loss.    2. Primary hypertension   She will continue her current medication.  - Her labs were all reviewed with her. There has been improvement.  - She was counseled on the need for dietary compliance and weight loss.  3. Type 2 diabetes,  She will continue her current medication.  - Her labs were all reviewed with her. There has been improvement.  - She was counseled on the need for dietary compliance and weight loss.    4. vitamin D deficiency    5. Acute pain of the right knee  - I have ordered an x-ray.    She was counseled on the need for flu shot this fall. She says she does not plan to get a COVID-19 vaccine. I will see her back in 6 months.    Transcribed from ambient dictation for Karina Arvizu MD by Alcides Hercules.  09/05/23   11:20 EDT    Patient or patient representative verbalized consent to the visit recording.  I have personally  performed the services described in this document as transcribed by the above individual, and it is both accurate and complete.

## 2023-09-05 NOTE — TELEPHONE ENCOUNTER
Caller: Tanna Bull    Relationship to patient: Self    Best call back number: [phone]    Patient is needing: PATIENT IS WANTING HER X RAY TO BE CHANGED TO [facility].

## 2023-09-05 NOTE — PATIENT INSTRUCTIONS
Keep working to lose weight through healthy eating and exercise.   No fried foods and limit pasta, bread, and sweets.

## 2023-12-11 RX ORDER — FUROSEMIDE 20 MG/1
20 TABLET ORAL DAILY
Qty: 90 TABLET | Refills: 1 | Status: SHIPPED | OUTPATIENT
Start: 2023-12-11

## 2024-01-22 RX ORDER — ERGOCALCIFEROL 1.25 MG/1
CAPSULE ORAL
Qty: 12 CAPSULE | Refills: 1 | Status: SHIPPED | OUTPATIENT
Start: 2024-01-22

## 2024-02-11 RX ORDER — BUPROPION HYDROCHLORIDE 300 MG/1
TABLET ORAL
Qty: 90 TABLET | Refills: 1 | Status: SHIPPED | OUTPATIENT
Start: 2024-02-11

## 2024-02-11 RX ORDER — NEBIVOLOL 10 MG/1
TABLET ORAL
Qty: 90 TABLET | Refills: 1 | Status: SHIPPED | OUTPATIENT
Start: 2024-02-11

## 2024-02-25 RX ORDER — INSULIN ASPART 100 [IU]/ML
INJECTION, SUSPENSION SUBCUTANEOUS
Qty: 90 ML | Refills: 1 | Status: SHIPPED | OUTPATIENT
Start: 2024-02-25

## 2024-02-28 LAB
25(OH)D3+25(OH)D2 SERPL-MCNC: 48.4 NG/ML (ref 30–100)
ALBUMIN SERPL-MCNC: 4.2 G/DL (ref 3.8–4.8)
ALBUMIN/GLOB SERPL: 1.6 {RATIO} (ref 1.2–2.2)
ALP SERPL-CCNC: 98 IU/L (ref 44–121)
ALT SERPL-CCNC: 10 IU/L (ref 0–32)
AMBIG ABBREV CMP14 DEFAULT: NORMAL
AMBIG ABBREV LP DEFAULT: NORMAL
AST SERPL-CCNC: 12 IU/L (ref 0–40)
BILIRUB SERPL-MCNC: 0.5 MG/DL (ref 0–1.2)
BUN SERPL-MCNC: 26 MG/DL (ref 8–27)
BUN/CREAT SERPL: 20 (ref 12–28)
CALCIUM SERPL-MCNC: 9.4 MG/DL (ref 8.7–10.3)
CHLORIDE SERPL-SCNC: 102 MMOL/L (ref 96–106)
CHOLEST SERPL-MCNC: 145 MG/DL (ref 100–199)
CO2 SERPL-SCNC: 23 MMOL/L (ref 20–29)
CREAT SERPL-MCNC: 1.32 MG/DL (ref 0.57–1)
EGFRCR SERPLBLD CKD-EPI 2021: 42 ML/MIN/1.73
GLOBULIN SER CALC-MCNC: 2.6 G/DL (ref 1.5–4.5)
GLUCOSE SERPL-MCNC: 151 MG/DL (ref 70–99)
HBA1C MFR BLD: 6.4 % (ref 4.8–5.6)
HDLC SERPL-MCNC: 37 MG/DL
LDLC SERPL CALC-MCNC: 76 MG/DL (ref 0–99)
POTASSIUM SERPL-SCNC: 4.6 MMOL/L (ref 3.5–5.2)
PROT SERPL-MCNC: 6.8 G/DL (ref 6–8.5)
SODIUM SERPL-SCNC: 139 MMOL/L (ref 134–144)
TRIGL SERPL-MCNC: 187 MG/DL (ref 0–149)
VLDLC SERPL CALC-MCNC: 32 MG/DL (ref 5–40)

## 2024-03-05 ENCOUNTER — OFFICE VISIT (OUTPATIENT)
Dept: FAMILY MEDICINE CLINIC | Facility: CLINIC | Age: 74
End: 2024-03-05
Payer: COMMERCIAL

## 2024-03-05 VITALS
HEART RATE: 55 BPM | WEIGHT: 248 LBS | RESPIRATION RATE: 16 BRPM | DIASTOLIC BLOOD PRESSURE: 72 MMHG | HEIGHT: 66 IN | BODY MASS INDEX: 39.86 KG/M2 | OXYGEN SATURATION: 96 % | SYSTOLIC BLOOD PRESSURE: 138 MMHG

## 2024-03-05 DIAGNOSIS — I10 PRIMARY HYPERTENSION: Primary | ICD-10-CM

## 2024-03-05 DIAGNOSIS — Z79.4 TYPE 2 DIABETES MELLITUS WITH CHRONIC KIDNEY DISEASE, WITH LONG-TERM CURRENT USE OF INSULIN, UNSPECIFIED CKD STAGE: ICD-10-CM

## 2024-03-05 DIAGNOSIS — E11.22 TYPE 2 DIABETES MELLITUS WITH CHRONIC KIDNEY DISEASE, WITH LONG-TERM CURRENT USE OF INSULIN, UNSPECIFIED CKD STAGE: ICD-10-CM

## 2024-03-05 DIAGNOSIS — E78.2 MIXED HYPERLIPIDEMIA: ICD-10-CM

## 2024-03-05 DIAGNOSIS — M25.562 ACUTE PAIN OF LEFT KNEE: ICD-10-CM

## 2024-03-05 PROCEDURE — 99213 OFFICE O/P EST LOW 20 MIN: CPT | Performed by: FAMILY MEDICINE

## 2024-03-05 RX ORDER — HYDROCODONE BITARTRATE AND ACETAMINOPHEN 5; 325 MG/1; MG/1
1 TABLET ORAL EVERY 6 HOURS PRN
Qty: 10 TABLET | Refills: 0 | Status: SHIPPED | OUTPATIENT
Start: 2024-03-05

## 2024-03-05 NOTE — PROGRESS NOTES
Subjective   Tanna Bull is a 73 y.o. female.     History of Present Illness     The patient is here for follow-up on her blood pressure, cholesterol, and diabetes.    The patient states that she tries to eat less because it seems to satisfy her. She has been eating more salad, but not regularly. She is taking turmeric and collagen.    Her blood pressure was elevated today. It is usually lower than that at home. She denies any chest pain or shortness of breath. She gets tired easier going up steps.    Her blood glucose is approximately in the 130s at home.    She requests a refill of hydrocodone for her knee pain. It had been a while since she saw her orthopedist. She does not want to get injections. She takes half a tablet because a whole tablet makes her sick.    The following portions of the patient's history were reviewed and updated as appropriate: allergies, current medications, past family history, past medical history, past social history, past surgical history, and problem list.  Past Medical History:   Diagnosis Date    Arthritis     Cataract 2009    Cholelithiasis ?    Colon polyp 2009    Depression     Mild     DM2 (diabetes mellitus, type 2)     High cholesterol     HTN (hypertension)     IDDM (insulin dependent diabetes mellitus)     Obesity      Past Surgical History:   Procedure Laterality Date    BACK SURGERY      BREAST BIOPSY Left     core bx (unsure when)    BUNIONECTOMY      CATARACT EXTRACTION      CHOLECYSTECTOMY      COLONOSCOPY N/A 09/01/2020    Procedure: COLONOSCOPY WITH POLYPECTOMY X4;  Surgeon: Jani Montoya MD;  Location: [facility] ENDOSCOPY;  Service: Gastroenterology;  Laterality: N/A;  diverticulosis, colon polyps    FRACTURE SURGERY  2008    ORIF ANKLE FRACTURE Left 2009     Family History   Problem Relation Age of Onset    Diabetes Other     Stroke Other     Breast cancer Other     Breast cancer Sister 60    Asthma Sister     Cancer Sister     Diabetes Maternal Aunt       Social History     Socioeconomic History    Marital status: Single   Tobacco Use    Smoking status: Former     Current packs/day: 0.00     Average packs/day: 0.5 packs/day for 27.6 years (13.8 ttl pk-yrs)     Types: Cigarettes     Start date: 1967     Quit date: 1995     Years since quittin.1    Smokeless tobacco: Never   Vaping Use    Vaping status: Never Used   Substance and Sexual Activity    Alcohol use: Yes     Comment: Once a month    Drug use: Never    Sexual activity: Not Currently         Current Outpatient Medications:     Blood Pressure Monitoring (Blood Pressure Digital Soln) kit, , Disp: , Rfl:     HYDROcodone-acetaminophen (Norco) 5-325 MG per tablet, Take 1 tablet by mouth Every 6 (Six) Hours As Needed for Moderate Pain., Disp: 10 tablet, Rfl: 0    aspirin 81 MG EC tablet, Take 1 tablet by mouth., Disp: , Rfl:     buPROPion XL (WELLBUTRIN XL) 300 MG 24 hr tablet, TAKE 1 TABLET DAILY, Disp: 90 tablet, Rfl: 1    fish oil-omega-3 fatty acids 1000 MG capsule, Take 1 capsule by mouth Daily., Disp: , Rfl:     furosemide (LASIX) 20 MG tablet, Take 1 tablet by mouth Daily., Disp: 90 tablet, Rfl: 1    Glucosamine-Chondroit-Collagen 125-100-10 MG tablet, [facility] GLUCO-CHONDROIT PLUS UC--100-10 MG TABS, Disp: , Rfl:     glucose blood test strip, One Touch Verio Test Strips- Use to check blood sugar three times a day. Dx: e11.65, Disp: 300 each, Rfl: 3    Insulin Pen Needle 31G X 5 MM misc, COMFORT EZ PEN NEEDLES 31G X 5 MM, Disp: , Rfl:     losartan (COZAAR) 50 MG tablet, Take 1 tablet by mouth Daily., Disp: , Rfl:     nebivolol (BYSTOLIC) 10 MG tablet, TAKE 1 TABLET DAILY, Disp: 90 tablet, Rfl: 1    NovoLOG Mix 70/30 FlexPen (70-30) 100 UNIT/ML suspension pen-injector injection, INJECT 0.5ML UNDER THE SKININTO THE APPROPRIATE AREA  TWO TIMES A DAY AS DIRECTED, Disp: 90 mL, Rfl: 1    simvastatin (ZOCOR) 40 MG tablet, TAKE 1 TABLET DAILY, Disp: 90 tablet, Rfl: 2    Turmeric 500 MG capsule, ,  "Disp: , Rfl:     vitamin D (ERGOCALCIFEROL) 1.25 MG (80439 UT) capsule capsule, TAKE 1 CAPSULE WEEKLY, Disp: 12 capsule, Rfl: 1    Review of Systems  /72 (BP Location: Left arm, Patient Position: Sitting, Cuff Size: Adult)   Pulse 55   Resp 16   Ht 167.6 cm (66\")   Wt 112 kg (248 lb)   SpO2 96%   BMI 40.03 kg/m²   Class 3 Severe Obesity (BMI >=40). Obesity-related health conditions include the following: diabetes mellitus. Obesity is improving with lifestyle modifications. BMI is is above average; BMI management plan is completed. We discussed portion control and increasing exercise.     A review of systems was performed, and the pertinent positives are noted in the HPI.     Objective   Physical Exam  Vitals and nursing note reviewed.   Constitutional:       Appearance: Normal appearance. She is well-developed and well-groomed. She is morbidly obese.   Cardiovascular:      Rate and Rhythm: Normal rate and regular rhythm.      Heart sounds: Normal heart sounds.   Pulmonary:      Breath sounds: Normal breath sounds.   Musculoskeletal:      Right lower leg: No edema.      Left lower leg: No edema.   Neurological:      Mental Status: She is alert.   Psychiatric:         Mood and Affect: Mood normal.         Behavior: Behavior is cooperative.       Lab Results   Component Value Date    GLUCOSE 151 (H) 02/27/2024    BUN 26 02/27/2024    CREATININE 1.32 (H) 02/27/2024    EGFRRESULT 42 (L) 02/27/2024    BCR 20 02/27/2024    K 4.6 02/27/2024    CO2 23 02/27/2024    CALCIUM 9.4 02/27/2024    PROTENTOTREF 6.8 02/27/2024    ALBUMIN 4.2 02/27/2024    BILITOT 0.5 02/27/2024    AST 12 02/27/2024    ALT 10 02/27/2024     Lab Results   Component Value Date    CHLPL 145 02/27/2024    TRIG 187 (H) 02/27/2024    HDL 37 (L) 02/27/2024    LDL 76 02/27/2024     Lab Results   Component Value Date    HGBA1C 6.4 (H) 02/27/2024         Assessment & Plan   Problems Addressed this Visit          Cardiac and Vasculature    Mixed " hyperlipidemia    Primary hypertension - Primary       Endocrine and Metabolic    Type 2 diabetes mellitus with diabetic chronic kidney disease     Other Visit Diagnoses       Acute pain of left knee        Relevant Medications    HYDROcodone-acetaminophen (Norco) 5-325 MG per tablet          Diagnoses         Codes Comments    Primary hypertension    -  Primary ICD-10-CM: I10  ICD-9-CM: 401.9     Mixed hyperlipidemia     ICD-10-CM: E78.2  ICD-9-CM: 272.2     Type 2 diabetes mellitus with chronic kidney disease, with long-term current use of insulin, unspecified CKD stage     ICD-10-CM: E11.22, Z79.4  ICD-9-CM: 250.40, 585.9, V58.67     Acute pain of left knee     ICD-10-CM: M25.562  ICD-9-CM: 719.46           1. Primary hypertension    2. Mixed hyperlipidemia    3. Type 2 diabetes.  All of her recent labs were reviewed and discussed with her. She will continue her current medications. She was counseled on the need for dietary compliance. She was encouraged to continue to try to increase her physical activity to help with further weight loss. She asked for a refill of the 10 pills of her hydrocodone to help with her knee pain that were given.    Follow-up  I will see her back in 6 months for follow-up and her Medicare wellness.    Transcribed from ambient dictation for Karina Arvizu MD by Alcides Hercules.  03/05/24   10:17 EST    Patient or patient representative verbalized consent to the visit recording.  I have personally performed the services described in this document as transcribed by the above individual, and it is both accurate and complete.

## 2024-04-15 RX ORDER — SIMVASTATIN 40 MG
TABLET ORAL
Qty: 90 TABLET | Refills: 2 | Status: SHIPPED | OUTPATIENT
Start: 2024-04-15

## 2024-07-07 RX ORDER — ERGOCALCIFEROL 1.25 MG/1
CAPSULE ORAL
Qty: 12 CAPSULE | Refills: 1 | Status: SHIPPED | OUTPATIENT
Start: 2024-07-07

## 2024-07-07 RX ORDER — FUROSEMIDE 20 MG/1
20 TABLET ORAL DAILY
Qty: 90 TABLET | Refills: 1 | Status: SHIPPED | OUTPATIENT
Start: 2024-07-07

## 2024-08-01 RX ORDER — NEBIVOLOL 10 MG/1
TABLET ORAL
Qty: 90 TABLET | Refills: 1 | Status: SHIPPED | OUTPATIENT
Start: 2024-08-01

## 2024-08-18 RX ORDER — BUPROPION HYDROCHLORIDE 300 MG/1
TABLET ORAL
Qty: 90 TABLET | Refills: 1 | Status: SHIPPED | OUTPATIENT
Start: 2024-08-18

## 2024-09-06 RX ORDER — INSULIN ASPART 100 [IU]/ML
INJECTION, SUSPENSION SUBCUTANEOUS
Qty: 90 ML | Refills: 1 | Status: SHIPPED | OUTPATIENT
Start: 2024-09-06

## 2024-09-11 LAB
25(OH)D3+25(OH)D2 SERPL-MCNC: 45.3 NG/ML (ref 30–100)
ALBUMIN SERPL-MCNC: 4 G/DL (ref 3.8–4.8)
ALP SERPL-CCNC: 108 IU/L (ref 44–121)
ALT SERPL-CCNC: 11 IU/L (ref 0–32)
AMBIG ABBREV CMP14 DEFAULT: NORMAL
AMBIG ABBREV LP DEFAULT: NORMAL
AST SERPL-CCNC: 13 IU/L (ref 0–40)
BILIRUB SERPL-MCNC: 0.5 MG/DL (ref 0–1.2)
BUN SERPL-MCNC: 33 MG/DL (ref 8–27)
BUN/CREAT SERPL: 22 (ref 12–28)
CALCIUM SERPL-MCNC: 9.4 MG/DL (ref 8.7–10.3)
CHLORIDE SERPL-SCNC: 101 MMOL/L (ref 96–106)
CHOLEST SERPL-MCNC: 144 MG/DL (ref 100–199)
CO2 SERPL-SCNC: 23 MMOL/L (ref 20–29)
CREAT SERPL-MCNC: 1.5 MG/DL (ref 0.57–1)
EGFRCR SERPLBLD CKD-EPI 2021: 36 ML/MIN/1.73
GLOBULIN SER CALC-MCNC: 2.7 G/DL (ref 1.5–4.5)
GLUCOSE SERPL-MCNC: 112 MG/DL (ref 70–99)
HBA1C MFR BLD: 6.1 % (ref 4.8–5.6)
HDLC SERPL-MCNC: 38 MG/DL
LDLC SERPL CALC-MCNC: 80 MG/DL (ref 0–99)
POTASSIUM SERPL-SCNC: 4.3 MMOL/L (ref 3.5–5.2)
PROT SERPL-MCNC: 6.7 G/DL (ref 6–8.5)
SODIUM SERPL-SCNC: 140 MMOL/L (ref 134–144)
TRIGL SERPL-MCNC: 148 MG/DL (ref 0–149)
VLDLC SERPL CALC-MCNC: 26 MG/DL (ref 5–40)

## 2024-09-16 ENCOUNTER — OFFICE VISIT (OUTPATIENT)
Dept: FAMILY MEDICINE CLINIC | Facility: CLINIC | Age: 74
End: 2024-09-16
Payer: COMMERCIAL

## 2024-09-16 VITALS
HEART RATE: 53 BPM | SYSTOLIC BLOOD PRESSURE: 138 MMHG | TEMPERATURE: 98.1 F | WEIGHT: 249 LBS | OXYGEN SATURATION: 95 % | DIASTOLIC BLOOD PRESSURE: 57 MMHG | BODY MASS INDEX: 42.51 KG/M2 | HEIGHT: 64 IN

## 2024-09-16 DIAGNOSIS — Z12.31 ENCOUNTER FOR SCREENING MAMMOGRAM FOR MALIGNANT NEOPLASM OF BREAST: ICD-10-CM

## 2024-09-16 DIAGNOSIS — E11.22 TYPE 2 DIABETES MELLITUS WITH CHRONIC KIDNEY DISEASE, WITH LONG-TERM CURRENT USE OF INSULIN, UNSPECIFIED CKD STAGE: Primary | ICD-10-CM

## 2024-09-16 DIAGNOSIS — I10 PRIMARY HYPERTENSION: ICD-10-CM

## 2024-09-16 DIAGNOSIS — Z78.0 POST-MENOPAUSAL: ICD-10-CM

## 2024-09-16 DIAGNOSIS — E78.2 MIXED HYPERLIPIDEMIA: ICD-10-CM

## 2024-09-16 DIAGNOSIS — F32.A DEPRESSION, UNSPECIFIED DEPRESSION TYPE: ICD-10-CM

## 2024-09-16 DIAGNOSIS — Z79.4 TYPE 2 DIABETES MELLITUS WITH CHRONIC KIDNEY DISEASE, WITH LONG-TERM CURRENT USE OF INSULIN, UNSPECIFIED CKD STAGE: Primary | ICD-10-CM

## 2024-09-16 PROCEDURE — 99214 OFFICE O/P EST MOD 30 MIN: CPT | Performed by: NURSE PRACTITIONER

## 2024-10-24 ENCOUNTER — HOSPITAL ENCOUNTER (OUTPATIENT)
Facility: HOSPITAL | Age: 74
Setting detail: OBSERVATION
Discharge: HOME | End: 2024-10-25
Attending: EMERGENCY MEDICINE | Admitting: EMERGENCY MEDICINE
Payer: COMMERCIAL

## 2024-10-24 DIAGNOSIS — E16.2 HYPOGLYCEMIA: Primary | ICD-10-CM

## 2024-10-24 DIAGNOSIS — R40.20 LOSS OF CONSCIOUSNESS: ICD-10-CM

## 2024-10-24 LAB
ALBUMIN SERPL-MCNC: 4.2 G/DL (ref 3.5–5.2)
ALBUMIN/GLOB SERPL: 1.4 G/DL
ALP SERPL-CCNC: 114 U/L (ref 39–117)
ALT SERPL W P-5'-P-CCNC: 12 U/L (ref 1–33)
ANION GAP SERPL CALCULATED.3IONS-SCNC: 9.7 MMOL/L (ref 5–15)
AST SERPL-CCNC: 18 U/L (ref 1–32)
BASOPHILS # BLD AUTO: 0.04 10*3/MM3 (ref 0–0.2)
BASOPHILS NFR BLD AUTO: 0.4 % (ref 0–1.5)
BILIRUB SERPL-MCNC: 0.2 MG/DL (ref 0–1.2)
BILIRUB UR QL STRIP: NEGATIVE
BUN SERPL-MCNC: 32 MG/DL (ref 8–23)
BUN/CREAT SERPL: 21.9 (ref 7–25)
CALCIUM SPEC-SCNC: 9.8 MG/DL (ref 8.6–10.5)
CHLORIDE SERPL-SCNC: 103 MMOL/L (ref 98–107)
CLARITY UR: CLEAR
CO2 SERPL-SCNC: 27.3 MMOL/L (ref 22–29)
COLOR UR: YELLOW
CREAT SERPL-MCNC: 1.46 MG/DL (ref 0.57–1)
DEPRECATED RDW RBC AUTO: 45.1 FL (ref 37–54)
EGFRCR SERPLBLD CKD-EPI 2021: 37.2 ML/MIN/1.73
EOSINOPHIL # BLD AUTO: 0.18 10*3/MM3 (ref 0–0.4)
EOSINOPHIL NFR BLD AUTO: 1.9 % (ref 0.3–6.2)
ERYTHROCYTE [DISTWIDTH] IN BLOOD BY AUTOMATED COUNT: 13.2 % (ref 12.3–15.4)
GLOBULIN UR ELPH-MCNC: 3.1 GM/DL
GLUCOSE BLDC GLUCOMTR-MCNC: 185 MG/DL (ref 70–105)
GLUCOSE BLDC GLUCOMTR-MCNC: 241 MG/DL (ref 70–105)
GLUCOSE BLDC GLUCOMTR-MCNC: 263 MG/DL (ref 70–105)
GLUCOSE SERPL-MCNC: 161 MG/DL (ref 65–99)
GLUCOSE UR STRIP-MCNC: NEGATIVE MG/DL
HCT VFR BLD AUTO: 38.3 % (ref 34–46.6)
HGB BLD-MCNC: 11.8 G/DL (ref 12–15.9)
HGB UR QL STRIP.AUTO: NEGATIVE
HOLD SPECIMEN: NORMAL
HOLD SPECIMEN: NORMAL
IMM GRANULOCYTES # BLD AUTO: 0.04 10*3/MM3 (ref 0–0.05)
IMM GRANULOCYTES NFR BLD AUTO: 0.4 % (ref 0–0.5)
KETONES UR QL STRIP: NEGATIVE
LEUKOCYTE ESTERASE UR QL STRIP.AUTO: NEGATIVE
LIPASE SERPL-CCNC: 14 U/L (ref 13–60)
LYMPHOCYTES # BLD AUTO: 1.17 10*3/MM3 (ref 0.7–3.1)
LYMPHOCYTES NFR BLD AUTO: 12.4 % (ref 19.6–45.3)
MCH RBC QN AUTO: 28.6 PG (ref 26.6–33)
MCHC RBC AUTO-ENTMCNC: 30.8 G/DL (ref 31.5–35.7)
MCV RBC AUTO: 93 FL (ref 79–97)
MONOCYTES # BLD AUTO: 0.52 10*3/MM3 (ref 0.1–0.9)
MONOCYTES NFR BLD AUTO: 5.5 % (ref 5–12)
NEUTROPHILS NFR BLD AUTO: 7.47 10*3/MM3 (ref 1.7–7)
NEUTROPHILS NFR BLD AUTO: 79.4 % (ref 42.7–76)
NITRITE UR QL STRIP: NEGATIVE
NRBC BLD AUTO-RTO: 0 /100 WBC (ref 0–0.2)
PH UR STRIP.AUTO: 6 [PH] (ref 5–8)
PLATELET # BLD AUTO: 231 10*3/MM3 (ref 140–450)
PMV BLD AUTO: 10.4 FL (ref 6–12)
POTASSIUM SERPL-SCNC: 4.6 MMOL/L (ref 3.5–5.2)
PROT SERPL-MCNC: 7.3 G/DL (ref 6–8.5)
PROT UR QL STRIP: NEGATIVE
RBC # BLD AUTO: 4.12 10*6/MM3 (ref 3.77–5.28)
SODIUM SERPL-SCNC: 140 MMOL/L (ref 136–145)
SP GR UR STRIP: 1.02 (ref 1–1.03)
UROBILINOGEN UR QL STRIP: NORMAL
WBC NRBC COR # BLD AUTO: 9.42 10*3/MM3 (ref 3.4–10.8)
WHOLE BLOOD HOLD COAG: NORMAL
WHOLE BLOOD HOLD SPECIMEN: NORMAL

## 2024-10-24 PROCEDURE — 80053 COMPREHEN METABOLIC PANEL: CPT

## 2024-10-24 PROCEDURE — G0378 HOSPITAL OBSERVATION PER HR: HCPCS

## 2024-10-24 PROCEDURE — 85025 COMPLETE CBC W/AUTO DIFF WBC: CPT

## 2024-10-24 PROCEDURE — 82948 REAGENT STRIP/BLOOD GLUCOSE: CPT

## 2024-10-24 PROCEDURE — 99285 EMERGENCY DEPT VISIT HI MDM: CPT

## 2024-10-24 PROCEDURE — 81003 URINALYSIS AUTO W/O SCOPE: CPT

## 2024-10-24 PROCEDURE — 63710000001 INSULIN GLARGINE PER 5 UNITS: Performed by: EMERGENCY MEDICINE

## 2024-10-24 PROCEDURE — 83690 ASSAY OF LIPASE: CPT

## 2024-10-24 PROCEDURE — 82948 REAGENT STRIP/BLOOD GLUCOSE: CPT | Performed by: EMERGENCY MEDICINE

## 2024-10-24 PROCEDURE — 63710000001 INSULIN LISPRO (HUMAN) PER 5 UNITS: Performed by: EMERGENCY MEDICINE

## 2024-10-24 RX ORDER — DEXTROSE MONOHYDRATE 25 G/50ML
10-50 INJECTION, SOLUTION INTRAVENOUS
Status: DISCONTINUED | OUTPATIENT
Start: 2024-10-24 | End: 2024-10-25

## 2024-10-24 RX ORDER — IBUPROFEN 400 MG/1
200 TABLET, FILM COATED ORAL EVERY 4 HOURS PRN
Status: DISCONTINUED | OUTPATIENT
Start: 2024-10-24 | End: 2024-10-25 | Stop reason: HOSPADM

## 2024-10-24 RX ORDER — POLYETHYLENE GLYCOL 3350 17 G/17G
17 POWDER, FOR SOLUTION ORAL DAILY PRN
Status: DISCONTINUED | OUTPATIENT
Start: 2024-10-24 | End: 2024-10-25 | Stop reason: HOSPADM

## 2024-10-24 RX ORDER — SODIUM CHLORIDE 0.9 % (FLUSH) 0.9 %
10 SYRINGE (ML) INJECTION EVERY 12 HOURS SCHEDULED
Status: DISCONTINUED | OUTPATIENT
Start: 2024-10-24 | End: 2024-10-25 | Stop reason: HOSPADM

## 2024-10-24 RX ORDER — BISACODYL 5 MG/1
5 TABLET, DELAYED RELEASE ORAL DAILY PRN
Status: DISCONTINUED | OUTPATIENT
Start: 2024-10-24 | End: 2024-10-25 | Stop reason: HOSPADM

## 2024-10-24 RX ORDER — BISACODYL 10 MG
10 SUPPOSITORY, RECTAL RECTAL DAILY PRN
Status: DISCONTINUED | OUTPATIENT
Start: 2024-10-24 | End: 2024-10-25 | Stop reason: HOSPADM

## 2024-10-24 RX ORDER — ONDANSETRON 2 MG/ML
4 INJECTION INTRAMUSCULAR; INTRAVENOUS EVERY 6 HOURS PRN
Status: DISCONTINUED | OUTPATIENT
Start: 2024-10-24 | End: 2024-10-25 | Stop reason: HOSPADM

## 2024-10-24 RX ORDER — AMOXICILLIN 250 MG
2 CAPSULE ORAL 2 TIMES DAILY PRN
Status: DISCONTINUED | OUTPATIENT
Start: 2024-10-24 | End: 2024-10-25 | Stop reason: HOSPADM

## 2024-10-24 RX ORDER — NICOTINE POLACRILEX 4 MG
15 LOZENGE BUCCAL
Status: DISCONTINUED | OUTPATIENT
Start: 2024-10-24 | End: 2024-10-25

## 2024-10-24 RX ORDER — INSULIN LISPRO 100 [IU]/ML
1-200 INJECTION, SOLUTION INTRAVENOUS; SUBCUTANEOUS
Status: DISCONTINUED | OUTPATIENT
Start: 2024-10-24 | End: 2024-10-25

## 2024-10-24 RX ORDER — INSULIN LISPRO 100 [IU]/ML
1-200 INJECTION, SOLUTION INTRAVENOUS; SUBCUTANEOUS AS NEEDED
Status: DISCONTINUED | OUTPATIENT
Start: 2024-10-24 | End: 2024-10-25

## 2024-10-24 RX ORDER — SODIUM CHLORIDE 0.9 % (FLUSH) 0.9 %
10 SYRINGE (ML) INJECTION AS NEEDED
Status: DISCONTINUED | OUTPATIENT
Start: 2024-10-24 | End: 2024-10-25 | Stop reason: HOSPADM

## 2024-10-24 RX ORDER — IBUPROFEN 600 MG/1
1 TABLET ORAL
Status: DISCONTINUED | OUTPATIENT
Start: 2024-10-24 | End: 2024-10-25

## 2024-10-24 RX ADMIN — INSULIN GLARGINE 14 UNITS: 100 INJECTION, SOLUTION SUBCUTANEOUS at 23:01

## 2024-10-24 RX ADMIN — INSULIN LISPRO 5 UNITS: 100 INJECTION, SOLUTION INTRAVENOUS; SUBCUTANEOUS at 23:01

## 2024-10-24 NOTE — ED PROVIDER NOTES
Subjective   Chief Complaint   Patient presents with    Hypoglycemia       History of Present Illness  Patient is a 74-year-old female who arrives today with reports from family that she was unresponsive when they got home.  Has history of diabetes and had a similar episode on Tuesday of this week however they are able to get her blood sugar up with oral Snapple beverage.  EMS reports that blood sugar was 37 upon their arrival.  She was given D50 as well as started on a D10 drip.  Patient reports that she has had diabetes for many years she takes 50 units of NovoLog 70-30 every morning between 9 and 915.  She does not check her blood sugar.  States that in September her A1c was 6.1.  Patient is requesting some diabetes education as she was not aware that sugars needed to be checked regularly and feels that 50 units of insulin might be too much for her.  States that she asked her primary care provider about this in September when her A1c was 6.1 and as they manage her medications no changes were made.  Review of Systems  Per HPI  Past Medical History:   Diagnosis Date    Arthritis     Cataract 2009    Cholelithiasis ?    Colon polyp 2009    Depression     Mild     DM2 (diabetes mellitus, type 2)     High cholesterol     HTN (hypertension)     IDDM (insulin dependent diabetes mellitus)     Obesity        Allergies   Allergen Reactions    Ace Inhibitors Unknown (See Comments)     Other reaction(s): Unknown (See Comments)  Critical    Penicillin G Unknown (See Comments)     Other reaction(s): Unknown (See Comments)       Past Surgical History:   Procedure Laterality Date    BACK SURGERY      BREAST BIOPSY Left     core bx (unsure when)    BUNIONECTOMY      CATARACT EXTRACTION      CHOLECYSTECTOMY      COLONOSCOPY N/A 09/01/2020    Procedure: COLONOSCOPY WITH POLYPECTOMY X4;  Surgeon: Jani Montoya MD;  Location: [facility] ENDOSCOPY;  Service: Gastroenterology;  Laterality: N/A;  diverticulosis, colon polyps     FRACTURE SURGERY  2008    ORIF ANKLE FRACTURE Left 2009       Family History   Problem Relation Age of Onset    Diabetes Other     Stroke Other     Breast cancer Other     Breast cancer Sister 60    Asthma Sister     Cancer Sister     Diabetes Maternal Aunt        Social History     Socioeconomic History    Marital status: Single   Tobacco Use    Smoking status: Former     Current packs/day: 0.00     Average packs/day: 0.5 packs/day for 27.6 years (13.8 ttl pk-yrs)     Types: Cigarettes     Start date: 1967     Quit date: 1995     Years since quittin.8    Smokeless tobacco: Never   Vaping Use    Vaping status: Never Used   Substance and Sexual Activity    Alcohol use: Yes     Comment: Once a month    Drug use: Never    Sexual activity: Not Currently           Objective   Physical Exam  Vitals and nursing note reviewed.   Constitutional:       General: She is not in acute distress.     Appearance: Normal appearance. She is obese. She is not ill-appearing, toxic-appearing or diaphoretic.   HENT:      Head: Normocephalic and atraumatic.      Right Ear: Tympanic membrane, ear canal and external ear normal.      Left Ear: Tympanic membrane, ear canal and external ear normal.      Nose: Nose normal.      Mouth/Throat:      Mouth: Mucous membranes are moist.      Pharynx: Oropharynx is clear.   Eyes:      Extraocular Movements: Extraocular movements intact.      Conjunctiva/sclera: Conjunctivae normal.      Pupils: Pupils are equal, round, and reactive to light.   Cardiovascular:      Rate and Rhythm: Normal rate and regular rhythm.      Pulses: Normal pulses.      Heart sounds: Normal heart sounds.   Pulmonary:      Effort: Pulmonary effort is normal.      Breath sounds: Normal breath sounds.   Abdominal:      General: Bowel sounds are normal.      Palpations: Abdomen is soft.   Musculoskeletal:         General: Normal range of motion.      Cervical back: Normal range of motion and neck supple.   Skin:      "General: Skin is warm and dry.      Capillary Refill: Capillary refill takes less than 2 seconds.   Neurological:      General: No focal deficit present.      Mental Status: She is alert.   Psychiatric:         Mood and Affect: Mood normal.         Behavior: Behavior normal.         Thought Content: Thought content normal.         Judgment: Judgment normal.         Procedures           ED Course      /41 (BP Location: Left arm, Patient Position: Lying)   Pulse 59   Temp 98.7 °F (37.1 °C) (Oral)   Resp 18   Ht 162.6 cm (64\")   Wt 115 kg (252 lb 13.9 oz)   SpO2 100%   BMI 43.40 kg/m²   Labs Reviewed   COMPREHENSIVE METABOLIC PANEL - Abnormal; Notable for the following components:       Result Value    Glucose 161 (*)     BUN 32 (*)     Creatinine 1.46 (*)     eGFR 37.2 (*)     All other components within normal limits    Narrative:     GFR Normal >60  Chronic Kidney Disease <60  Kidney Failure <15    The GFR formula is only valid for adults with stable renal function between ages 18 and 70.   CBC WITH AUTO DIFFERENTIAL - Abnormal; Notable for the following components:    Hemoglobin 11.8 (*)     MCHC 30.8 (*)     Neutrophil % 79.4 (*)     Lymphocyte % 12.4 (*)     Neutrophils, Absolute 7.47 (*)     All other components within normal limits   POCT GLUCOSE FINGERSTICK - Abnormal; Notable for the following components:    Glucose 241 (*)     All other components within normal limits   POCT GLUCOSE FINGERSTICK - Abnormal; Notable for the following components:    Glucose 185 (*)     All other components within normal limits   POCT GLUCOSE FINGERSTICK - Abnormal; Notable for the following components:    Glucose 263 (*)     All other components within normal limits   URINALYSIS W/ MICROSCOPIC IF INDICATED (NO CULTURE) - Normal    Narrative:     Urine microscopic not indicated.   LIPASE - Normal   RAINBOW DRAW    Narrative:     The following orders were created for panel order [place] Draw.  Procedure               "                 Abnormality         Status                     ---------                               -----------         ------                     Green Top (Gel)[[id number]]                                  Final result               Lavender Top[[id number]]                                     Final result               Gold Top - SST[[id number]]                                   Final result               Light Blue Top[[id number]]                                   Final result                 Please view results for these tests on the individual orders.   BASIC METABOLIC PANEL   CBC WITH AUTO DIFFERENTIAL   POCT GLUCOSE FINGERSTICK   POCT GLUCOSE FINGERSTICK   POCT GLUCOSE FINGERSTICK   POCT GLUCOSE FINGERSTICK   POCT GLUCOSE FINGERSTICK   CBC AND DIFFERENTIAL    Narrative:     The following orders were created for panel order CBC & Differential.  Procedure                               Abnormality         Status                     ---------                               -----------         ------                     CBC Auto Differential[[id number]]        Abnormal            Final result                 Please view results for these tests on the individual orders.   GREEN TOP   LAVENDER TOP   GOLD TOP - SST   LIGHT BLUE TOP     .edme  No radiology results for the last day                                           Medical Decision Making  Patient presented with hypoglycemia.  History obtained from patient and patient's family.    EKG reviewed: Not indicated    Labs reviewed:   Labs Reviewed   COMPREHENSIVE METABOLIC PANEL - Abnormal; Notable for the following components:       Result Value    Glucose 161 (*)     BUN 32 (*)     Creatinine 1.46 (*)     eGFR 37.2 (*)     All other components within normal limits    Narrative:     GFR Normal >60  Chronic Kidney Disease <60  Kidney Failure <15    The GFR formula is only valid for adults with stable renal function between ages 18 and 70.   CBC WITH AUTO DIFFERENTIAL -  Abnormal; Notable for the following components:    Hemoglobin 11.8 (*)     MCHC 30.8 (*)     Neutrophil % 79.4 (*)     Lymphocyte % 12.4 (*)     Neutrophils, Absolute 7.47 (*)     All other components within normal limits   POCT GLUCOSE FINGERSTICK - Abnormal; Notable for the following components:    Glucose 241 (*)     All other components within normal limits   POCT GLUCOSE FINGERSTICK - Abnormal; Notable for the following components:    Glucose 185 (*)     All other components within normal limits   POCT GLUCOSE FINGERSTICK - Abnormal; Notable for the following components:    Glucose 263 (*)     All other components within normal limits   URINALYSIS W/ MICROSCOPIC IF INDICATED (NO CULTURE) - Normal    Narrative:     Urine microscopic not indicated.   LIPASE - Normal   RAINBOW DRAW    Narrative:     The following orders were created for panel order [place] Draw.  Procedure                               Abnormality         Status                     ---------                               -----------         ------                     Green Top (Gel)[[id number]]                                  Final result               Lavender Top[[id number]]                                     Final result               Gold Top - SST[[id number]]                                   Final result               Light Blue Top[[id number]]                                   Final result                 Please view results for these tests on the individual orders.   BASIC METABOLIC PANEL   CBC WITH AUTO DIFFERENTIAL   POCT GLUCOSE FINGERSTICK   POCT GLUCOSE FINGERSTICK   POCT GLUCOSE FINGERSTICK   POCT GLUCOSE FINGERSTICK   POCT GLUCOSE FINGERSTICK   CBC AND DIFFERENTIAL    Narrative:     The following orders were created for panel order CBC & Differential.  Procedure                               Abnormality         Status                     ---------                               -----------         ------                     CBC Auto  Differential[[id number]]        Abnormal            Final result                 Please view results for these tests on the individual orders.   GREEN TOP   LAVENDER TOP   GOLD TOP - SST   LIGHT BLUE TOP         Imaging reviewed: Not indicated    Reviewed external records from 9/16/2024 with primary care for diabetes where A1c was 6.1 no changes made to insulin..    No internal records relevant for review    Overall presentation is consistent with hypoglycemic event.     On evaluation of patient IV was established and labs were obtained.  CBC essentially unremarkable hemoglobin 11.8 which is normal for this patient.  CMP does show glucose 161 BUN 32 creatinine 1.46 and GFR 37.2.  This is not improved blood glucose patient does have chronic kidney disease so these numbers are within normal for her.  Urinalysis shows no signs of infection.  Lipase 14.  Discussed these results with patient as well as risk to go low again and need for diabetes education so patient was  placed in ED observation for diabetes educator consult and monitoring of blood glucose    Final diagnoses:   Hypoglycemia   Loss of consciousness       ED Disposition  ED Disposition       ED Disposition   Decision to Admit    Condition   --    Comment   --               No follow-up provider specified.       Medication List        Changed      NovoLOG Mix 70/30 FlexPen (70-30) 100 UNIT/ML suspension pen-injector injection  Generic drug: insulin aspart prot & aspart  INJECT 0.5ML UNDER THE SKININTO THE APPROPRIATE AREA  TWO TIMES A DAY AS DIRECTED  What changed: See the new instructions.                 Batool Pace, APRN  10/24/24 [id number]

## 2024-10-25 ENCOUNTER — READMISSION MANAGEMENT (OUTPATIENT)
Dept: CALL CENTER | Facility: HOSPITAL | Age: 74
End: 2024-10-25
Payer: COMMERCIAL

## 2024-10-25 VITALS
HEIGHT: 64 IN | WEIGHT: 249.78 LBS | DIASTOLIC BLOOD PRESSURE: 60 MMHG | BODY MASS INDEX: 42.64 KG/M2 | RESPIRATION RATE: 18 BRPM | OXYGEN SATURATION: 96 % | HEART RATE: 64 BPM | SYSTOLIC BLOOD PRESSURE: 130 MMHG | TEMPERATURE: 98 F

## 2024-10-25 LAB
ANION GAP SERPL CALCULATED.3IONS-SCNC: 7.7 MMOL/L (ref 5–15)
BASOPHILS # BLD AUTO: 0.04 10*3/MM3 (ref 0–0.2)
BASOPHILS NFR BLD AUTO: 0.5 % (ref 0–1.5)
BUN SERPL-MCNC: 30 MG/DL (ref 8–23)
BUN/CREAT SERPL: 21.3 (ref 7–25)
CALCIUM SPEC-SCNC: 9.2 MG/DL (ref 8.6–10.5)
CHLORIDE SERPL-SCNC: 104 MMOL/L (ref 98–107)
CO2 SERPL-SCNC: 26.3 MMOL/L (ref 22–29)
CREAT SERPL-MCNC: 1.41 MG/DL (ref 0.57–1)
DEPRECATED RDW RBC AUTO: 44.8 FL (ref 37–54)
EGFRCR SERPLBLD CKD-EPI 2021: 38.7 ML/MIN/1.73
EOSINOPHIL # BLD AUTO: 0.15 10*3/MM3 (ref 0–0.4)
EOSINOPHIL NFR BLD AUTO: 1.8 % (ref 0.3–6.2)
ERYTHROCYTE [DISTWIDTH] IN BLOOD BY AUTOMATED COUNT: 13.2 % (ref 12.3–15.4)
GLUCOSE BLDC GLUCOMTR-MCNC: 126 MG/DL (ref 70–105)
GLUCOSE BLDC GLUCOMTR-MCNC: 192 MG/DL (ref 70–105)
GLUCOSE SERPL-MCNC: 155 MG/DL (ref 65–99)
HCT VFR BLD AUTO: 34 % (ref 34–46.6)
HGB BLD-MCNC: 11 G/DL (ref 12–15.9)
IMM GRANULOCYTES # BLD AUTO: 0.03 10*3/MM3 (ref 0–0.05)
IMM GRANULOCYTES NFR BLD AUTO: 0.4 % (ref 0–0.5)
LYMPHOCYTES # BLD AUTO: 2.03 10*3/MM3 (ref 0.7–3.1)
LYMPHOCYTES NFR BLD AUTO: 24.3 % (ref 19.6–45.3)
MCH RBC QN AUTO: 30.1 PG (ref 26.6–33)
MCHC RBC AUTO-ENTMCNC: 32.4 G/DL (ref 31.5–35.7)
MCV RBC AUTO: 92.9 FL (ref 79–97)
MONOCYTES # BLD AUTO: 0.67 10*3/MM3 (ref 0.1–0.9)
MONOCYTES NFR BLD AUTO: 8 % (ref 5–12)
NEUTROPHILS NFR BLD AUTO: 5.44 10*3/MM3 (ref 1.7–7)
NEUTROPHILS NFR BLD AUTO: 65 % (ref 42.7–76)
NRBC BLD AUTO-RTO: 0 /100 WBC (ref 0–0.2)
PLATELET # BLD AUTO: 183 10*3/MM3 (ref 140–450)
PMV BLD AUTO: 10.7 FL (ref 6–12)
POTASSIUM SERPL-SCNC: 4.2 MMOL/L (ref 3.5–5.2)
RBC # BLD AUTO: 3.66 10*6/MM3 (ref 3.77–5.28)
SODIUM SERPL-SCNC: 138 MMOL/L (ref 136–145)
WBC NRBC COR # BLD AUTO: 8.36 10*3/MM3 (ref 3.4–10.8)

## 2024-10-25 PROCEDURE — 82948 REAGENT STRIP/BLOOD GLUCOSE: CPT | Performed by: EMERGENCY MEDICINE

## 2024-10-25 PROCEDURE — 85025 COMPLETE CBC W/AUTO DIFF WBC: CPT | Performed by: EMERGENCY MEDICINE

## 2024-10-25 PROCEDURE — 80048 BASIC METABOLIC PNL TOTAL CA: CPT | Performed by: EMERGENCY MEDICINE

## 2024-10-25 PROCEDURE — 63710000001 INSULIN LISPRO (HUMAN) PER 5 UNITS: Performed by: NURSE PRACTITIONER

## 2024-10-25 PROCEDURE — G0378 HOSPITAL OBSERVATION PER HR: HCPCS

## 2024-10-25 PROCEDURE — 82948 REAGENT STRIP/BLOOD GLUCOSE: CPT | Performed by: NURSE PRACTITIONER

## 2024-10-25 PROCEDURE — 63710000001 INSULIN GLARGINE PER 5 UNITS: Performed by: NURSE PRACTITIONER

## 2024-10-25 RX ORDER — LOSARTAN POTASSIUM 50 MG/1
50 TABLET ORAL DAILY
Status: DISCONTINUED | OUTPATIENT
Start: 2024-10-25 | End: 2024-10-25 | Stop reason: HOSPADM

## 2024-10-25 RX ORDER — ATORVASTATIN CALCIUM 20 MG/1
20 TABLET, FILM COATED ORAL NIGHTLY
Status: DISCONTINUED | OUTPATIENT
Start: 2024-10-25 | End: 2024-10-25 | Stop reason: HOSPADM

## 2024-10-25 RX ORDER — FUROSEMIDE 20 MG
20 TABLET ORAL DAILY
Status: DISCONTINUED | OUTPATIENT
Start: 2024-10-25 | End: 2024-10-25 | Stop reason: HOSPADM

## 2024-10-25 RX ORDER — NEBIVOLOL 10 MG/1
10 TABLET ORAL DAILY
Status: DISCONTINUED | OUTPATIENT
Start: 2024-10-25 | End: 2024-10-25 | Stop reason: HOSPADM

## 2024-10-25 RX ORDER — INSULIN LISPRO 100 [IU]/ML
2-9 INJECTION, SOLUTION INTRAVENOUS; SUBCUTANEOUS
Status: DISCONTINUED | OUTPATIENT
Start: 2024-10-25 | End: 2024-10-25 | Stop reason: HOSPADM

## 2024-10-25 RX ORDER — INSULIN ASPART 100 [IU]/ML
40 INJECTION, SUSPENSION SUBCUTANEOUS
Start: 2024-10-25

## 2024-10-25 RX ORDER — BUPROPION HYDROCHLORIDE 150 MG/1
300 TABLET ORAL DAILY
Status: DISCONTINUED | OUTPATIENT
Start: 2024-10-25 | End: 2024-10-25 | Stop reason: HOSPADM

## 2024-10-25 RX ORDER — NICOTINE POLACRILEX 4 MG
15 LOZENGE BUCCAL
Status: DISCONTINUED | OUTPATIENT
Start: 2024-10-25 | End: 2024-10-25 | Stop reason: HOSPADM

## 2024-10-25 RX ORDER — DEXTROSE MONOHYDRATE 25 G/50ML
25 INJECTION, SOLUTION INTRAVENOUS
Status: DISCONTINUED | OUTPATIENT
Start: 2024-10-25 | End: 2024-10-25 | Stop reason: HOSPADM

## 2024-10-25 RX ORDER — ASPIRIN 81 MG/1
81 TABLET ORAL DAILY
Status: DISCONTINUED | OUTPATIENT
Start: 2024-10-25 | End: 2024-10-25 | Stop reason: HOSPADM

## 2024-10-25 RX ORDER — INSULIN LISPRO 100 [IU]/ML
5 INJECTION, SOLUTION INTRAVENOUS; SUBCUTANEOUS
Status: DISCONTINUED | OUTPATIENT
Start: 2024-10-25 | End: 2024-10-25

## 2024-10-25 RX ORDER — IBUPROFEN 600 MG/1
1 TABLET ORAL
Status: DISCONTINUED | OUTPATIENT
Start: 2024-10-25 | End: 2024-10-25 | Stop reason: HOSPADM

## 2024-10-25 RX ADMIN — INSULIN LISPRO 2 UNITS: 100 INJECTION, SOLUTION INTRAVENOUS; SUBCUTANEOUS at 11:54

## 2024-10-25 RX ADMIN — INSULIN GLARGINE 15 UNITS: 100 INJECTION, SOLUTION SUBCUTANEOUS at 09:33

## 2024-10-25 RX ADMIN — Medication 10 ML: at 09:32

## 2024-10-25 RX ADMIN — LOSARTAN POTASSIUM 50 MG: 50 TABLET, FILM COATED ORAL at 09:33

## 2024-10-25 RX ADMIN — FUROSEMIDE 20 MG: 20 TABLET ORAL at 09:33

## 2024-10-25 RX ADMIN — NEBIVOLOL 10 MG: 10 TABLET ORAL at 09:32

## 2024-10-25 RX ADMIN — BUPROPION HYDROCHLORIDE 300 MG: 150 TABLET, EXTENDED RELEASE ORAL at 09:33

## 2024-10-25 NOTE — CONSULTS
"Diabetes Education  Assessment/Teaching    Patient Name:  Tanna Bull  YOB: 1950  MRN: [id number]  Admit Date:  10/24/2024      Assessment Date:  10/25/2024  Flowsheet Row Most Recent Value   General Information     Referral From: A1c, Blood glucose, MD order  [MD consult to teach insulin administration, blood sugar per EMS was 37, and on 9/10/2024 A1c was 6.1%.]   Height 162.6 cm (64\")   Height Method Stated   Weight 113 kg (249 lb 12.5 oz)   Weight Method Bed scale   Pregnancy Assessment    Diabetes History    What type of diabetes do you have? Type 2   Length of Diabetes Diagnosis 10 + years  [Patient stated she was diagnosed at least 25-30 years ago.]   Current DM knowledge fair   Do you test your blood sugar at home? yes   Frequency of checks maybe once a month   Meter type unsure of name but 2-3 years old   Who performs the test? patient   Typical readings 130   Have you had low blood sugar? (<70mg/dl) yes   How often do you have low blood sugar? frequently  [Blood sugar 37 per EMS]   Education Preferences    What areas of diabetes would you like to learn about? avoiding low blood sugar, diabetes complications, testing my blood sugar at home, medications for diabetes   Nutrition Information    Assessment Topics    Taking Medication - Assessment Needs education   Problem Solving - Assessment Needs education   Reducing Risk - Assessment Needs education   Monitoring - Assessment Needs education   DM Goals    Taking Medication - Goal Today   Problem Solving - Goal Today   Reducing Risk - Goal Today   Monitoring - Goal Today            Flowsheet Row Most Recent Value   DM Education Needs    Meter Has own   Frequency of Testing AC meals  [Recommended to patient to check blood sugar before breakfast and supper prior to giving insulin.]   Medication Insulin, Pen  [At home patient stated that she takes Novolog 70/30 50 units before breakfast and supper.]   Problem Solving Hypoglycemia, Signs, " Symptoms, Treatment   Reducing Risks A1C testing  [On 9/10/2024 A1c was 6.1%.]   Discharge Plan Home   Motivation Moderate   Teaching Method Discussion, Handouts   Patient Response Verbalized understanding              Other Comments:  A1c info sheet given with discussion on A1c target and healthy blood sugar range. Patient stated she is needing knee surgery and was told to lose 15 pounds so she has decreased her snacks and carb intake the last 3 weeks. Patient stated she has had low blood sugars twice this week. Explained to patient that with decreasing her carb intake and changing her diet has decreased her insulin needs. Handout given with discussion on hypoglycemia signs, symptoms, and treatment. Explained to patient that her insulin would be decreased to 40 units before breakfast and supper. Patient has no further questions or concerns related to diabetes at this time.        Electronically signed by:  Monique Fry RN  10/25/24 13:37 EDT

## 2024-10-25 NOTE — DISCHARGE SUMMARY
"[facility]    Karina Arvizu MD    CHIEF COMPLAINT:     Hypoglycemia     HISTORY OF PRESENT ILLNESS:    Hypoglycemia  Pertinent negatives include no nausea or vomiting.     ED 10/24/2024  History of Present Illness  Patient is a 74-year-old female who arrives today with reports from family that she was unresponsive when they got home.  Has history of diabetes and had a similar episode on Tuesday of this week however they are able to get her blood sugar up with oral Snapple beverage.  EMS reports that blood sugar was 37 upon their arrival.  She was given D50 as well as started on a D10 drip.  Patient reports that she has had diabetes for many years she takes 50 units of NovoLog 70-30 every morning between 9 and 915.  She does not check her blood sugar.  States that in September her A1c was 6.1.  Patient is requesting some diabetes education as she was not aware that sugars needed to be checked regularly and feels that 50 units of insulin might be too much for her.  States that she asked her primary care provider about this in September when her A1c was 6.1 and as they manage her medications no changes were made.    Observation 10/25/2024  Patient agrees with HPI noted above.  She reports that she has been on current dose of insulin NovoLog 70/30 15 units twice daily for many years and that when she started on that dose her A1c was higher.  However now A1c 6.1 and she has noted hypoglycemia at times including blood sugar mid 30s on Wednesday and Thursday in the afternoon. She has been trying to lose weight per ortho's recommendations as she needs to have knee surgery.  She reports feeling \" strange\" at that time but is currently asymptomatic and eager for discharge later. Diabetes educator consulted.    Past Medical History:   Diagnosis Date    Arthritis     Cataract 2009    Cholelithiasis ?    Colon polyp 2009    Depression     Mild     DM2 (diabetes mellitus, type 2)     High " cholesterol     HTN (hypertension)     IDDM (insulin dependent diabetes mellitus)     Obesity      Past Surgical History:   Procedure Laterality Date    BACK SURGERY      BREAST BIOPSY Left     core bx (unsure when)    BUNIONECTOMY      CATARACT EXTRACTION      CHOLECYSTECTOMY      COLONOSCOPY N/A 2020    Procedure: COLONOSCOPY WITH POLYPECTOMY X4;  Surgeon: Jani Montoya MD;  Location: [facility] ENDOSCOPY;  Service: Gastroenterology;  Laterality: N/A;  diverticulosis, colon polyps    FRACTURE SURGERY      ORIF ANKLE FRACTURE Left 2009     Family History   Problem Relation Age of Onset    Diabetes Other     Stroke Other     Breast cancer Other     Breast cancer Sister 60    Asthma Sister     Cancer Sister     Diabetes Maternal Aunt      Social History     Tobacco Use    Smoking status: Former     Current packs/day: 0.00     Average packs/day: 0.5 packs/day for 27.6 years (13.8 ttl pk-yrs)     Types: Cigarettes     Start date: 1967     Quit date: 1995     Years since quittin.8    Smokeless tobacco: Never   Vaping Use    Vaping status: Never Used   Substance Use Topics    Alcohol use: Yes     Comment: Once a month    Drug use: Never     Medications Prior to Admission   Medication Sig Dispense Refill Last Dose/Taking    aspirin 81 MG EC tablet Take 1 tablet by mouth.   10/23/2024    buPROPion XL (WELLBUTRIN XL) 300 MG 24 hr tablet TAKE 1 TABLET DAILY 90 tablet 1 10/24/2024    fish oil-omega-3 fatty acids 1000 MG capsule Take 1 capsule by mouth Daily.   10/23/2024    furosemide (LASIX) 20 MG tablet TAKE 1 TABLET DAILY 90 tablet 1 10/23/2024    Glucosamine-Chondroit-Collagen 125-100-10 MG tablet [facility] GLUCO-CHONDROIT PLUS UC--100-10 MG TABS   10/23/2024    Insulin Pen Needle 31G X 5 MM misc COMFORT EZ PEN NEEDLES 31G X 5 MM   10/23/2024    losartan (COZAAR) 50 MG tablet Take 1 tablet by mouth Daily.   10/24/2024    nebivolol (BYSTOLIC) 10 MG tablet TAKE 1 TABLET DAILY 90 tablet 1  10/23/2024    simvastatin (ZOCOR) 40 MG tablet TAKE 1 TABLET DAILY 90 tablet 2 10/24/2024    Turmeric 500 MG capsule    10/23/2024    vitamin D (ERGOCALCIFEROL) 1.25 MG (80551 UT) capsule capsule TAKE 1 CAPSULE WEEKLY 12 capsule 1 10/23/2024    [DISCONTINUED] NovoLOG Mix 70/30 FlexPen (70-30) 100 UNIT/ML suspension pen-injector injection INJECT 0.5ML UNDER THE SKININTO THE APPROPRIATE AREA  TWO TIMES A DAY AS DIRECTED (Patient taking differently: 0.5 mL 2 (Two) Times a Day Before Meals.) 90 mL 1 Patient Taking Differently    Blood Pressure Monitoring (Blood Pressure Digital Soln) kit        glucose blood test strip One Touch Verio Test Strips- Use to check blood sugar three times a day. Dx: e11.65 300 each 3      Allergies:  Ace inhibitors and Penicillin g    Immunization History   Administered Date(s) Administered    Arexvy (RSV, Adults 60+ yrs) 10/23/2023    COVID-19 (MODERNA) 12YRS+ (SPIKEVAX) 09/12/2024    COVID-19 (MODERNA) BIVALENT 12+YRS 11/21/2022    COVID-19 (PFIZER) Purple Cap Monovalent 02/03/2021, 02/24/2021, 10/21/2021    Flu Vaccine Quad PF >36MO 10/25/2016    Fluad Quad 65+ 10/12/2021    Fluzone High-Dose 65+YRS 10/22/2018, 10/13/2019, 09/19/2020, 09/12/2024    Fluzone High-Dose 65+yrs 09/19/2020, 10/10/2022, 09/28/2023    H1N1 Inj 12/29/2009    Pneumococcal Conjugate 13-Valent (PCV13) 10/27/2015    Tdap 01/13/2017    Zostavax 04/01/2012           REVIEW OF SYSTEMS:    Review of Systems   Constitutional: Positive for malaise/fatigue.   Endocrine:        Hypoglycemia    Gastrointestinal:  Negative for nausea and vomiting.   All other systems reviewed and are negative.        Vital Signs  Temp:  [97.3 °F (36.3 °C)-98.7 °F (37.1 °C)] 98 °F (36.7 °C)  Heart Rate:  [58-73] 64  Resp:  [18] 18  BP: (109-188)/(41-78) 130/60          Physical Exam:  Physical Exam  Vitals and nursing note reviewed.   Constitutional:       Appearance: Normal appearance. She is obese.   HENT:      Head: Normocephalic and  atraumatic.      Right Ear: External ear normal.      Left Ear: External ear normal.      Nose: Nose normal.      Mouth/Throat:      Mouth: Mucous membranes are moist.      Pharynx: Oropharynx is clear.   Eyes:      Extraocular Movements: Extraocular movements intact.   Cardiovascular:      Rate and Rhythm: Normal rate and regular rhythm.      Pulses: Normal pulses.      Heart sounds: Normal heart sounds.   Pulmonary:      Effort: Pulmonary effort is normal.      Breath sounds: Normal breath sounds.   Abdominal:      General: Abdomen is flat. Bowel sounds are normal.      Palpations: Abdomen is soft.   Musculoskeletal:         General: Normal range of motion.      Cervical back: Normal range of motion.   Skin:     General: Skin is warm.   Neurological:      General: No focal deficit present.      Mental Status: She is alert and oriented to person, place, and time.   Psychiatric:         Mood and Affect: Mood normal.         Behavior: Behavior normal.           Emotional Behavior:    Normal   Debilities:   None  Results Review:    I reviewed the patient's new clinical results.  Lab Results (most recent)       Procedure Component Value Units Date/Time    POC Glucose 4x Daily Before Meals & at Bedtime [[id number]]  (Abnormal) Collected: 10/25/24 0734    Specimen: Blood Updated: 10/25/24 0736     Glucose 126 mg/dL      Comment: Serial Number: [id number]:  [id number]       Basic Metabolic Panel [[id number]]  (Abnormal) Collected: 10/25/24 0339    Specimen: Blood Updated: 10/25/24 0510     Glucose 155 mg/dL      BUN 30 mg/dL      Creatinine 1.41 mg/dL      Sodium 138 mmol/L      Potassium 4.2 mmol/L      Chloride 104 mmol/L      CO2 26.3 mmol/L      Calcium 9.2 mg/dL      BUN/Creatinine Ratio 21.3     Anion Gap 7.7 mmol/L      eGFR 38.7 mL/min/1.73     Narrative:      GFR Normal >60  Chronic Kidney Disease <60  Kidney Failure <15    The GFR formula is only valid for adults with stable renal function between ages 18  and 70.    CBC Auto Differential [[id number]]  (Abnormal) Collected: 10/25/24 0339    Specimen: Blood Updated: 10/25/24 0446     WBC 8.36 10*3/mm3      RBC 3.66 10*6/mm3      Hemoglobin 11.0 g/dL      Hematocrit 34.0 %      MCV 92.9 fL      MCH 30.1 pg      MCHC 32.4 g/dL      RDW 13.2 %      RDW-SD 44.8 fl      MPV 10.7 fL      Platelets 183 10*3/mm3      Neutrophil % 65.0 %      Lymphocyte % 24.3 %      Monocyte % 8.0 %      Eosinophil % 1.8 %      Basophil % 0.5 %      Immature Grans % 0.4 %      Neutrophils, Absolute 5.44 10*3/mm3      Lymphocytes, Absolute 2.03 10*3/mm3      Monocytes, Absolute 0.67 10*3/mm3      Eosinophils, Absolute 0.15 10*3/mm3      Basophils, Absolute 0.04 10*3/mm3      Immature Grans, Absolute 0.03 10*3/mm3      nRBC 0.0 /100 WBC     POC Glucose 4x Daily Before Meals & at Bedtime [[id number]]  (Abnormal) Collected: 10/24/24 2222    Specimen: Blood Updated: 10/24/24 2224     Glucose 263 mg/dL      Comment: Serial Number: [id number]:  [id number]       Urinalysis With Microscopic If Indicated (No Culture) - Urine, Clean Catch [[id number]]  (Normal) Collected: 10/24/24 1950    Specimen: Urine, Clean Catch Updated: 10/24/24 1958     Color, UA Yellow     Appearance, UA Clear     pH, UA 6.0     Specific Gravity, UA 1.023     Glucose, UA Negative     Ketones, UA Negative     Bilirubin, UA Negative     Blood, UA Negative     Protein, UA Negative     Leuk Esterase, UA Negative     Nitrite, UA Negative     Urobilinogen, UA 1.0 E.U./dL    Narrative:      Urine microscopic not indicated.    Comprehensive Metabolic Panel [[id number]]  (Abnormal) Collected: 10/24/24 1918    Specimen: Blood Updated: 10/24/24 1946     Glucose 161 mg/dL      BUN 32 mg/dL      Creatinine 1.46 mg/dL      Sodium 140 mmol/L      Potassium 4.6 mmol/L      Chloride 103 mmol/L      CO2 27.3 mmol/L      Calcium 9.8 mg/dL      Total Protein 7.3 g/dL      Albumin 4.2 g/dL      ALT (SGPT) 12 U/L      AST (SGOT) 18 U/L      Alkaline  Phosphatase 114 U/L      Total Bilirubin 0.2 mg/dL      Globulin 3.1 gm/dL      A/G Ratio 1.4 g/dL      BUN/Creatinine Ratio 21.9     Anion Gap 9.7 mmol/L      eGFR 37.2 mL/min/1.73     Narrative:      GFR Normal >60  Chronic Kidney Disease <60  Kidney Failure <15    The GFR formula is only valid for adults with stable renal function between ages 18 and 70.    Lipase [[id number]]  (Normal) Collected: 10/24/24 1918    Specimen: Blood Updated: 10/24/24 1946     Lipase 14 U/L     CBC & Differential [[id number]]  (Abnormal) Collected: 10/24/24 1918    Specimen: Blood Updated: 10/24/24 1939    Narrative:      The following orders were created for panel order CBC & Differential.  Procedure                               Abnormality         Status                     ---------                               -----------         ------                     CBC Auto Differential[[id number]]        Abnormal            Final result                 Please view results for these tests on the individual orders.    CBC Auto Differential [[id number]]  (Abnormal) Collected: 10/24/24 1918    Specimen: Blood Updated: 10/24/24 1939     WBC 9.42 10*3/mm3      RBC 4.12 10*6/mm3      Hemoglobin 11.8 g/dL      Hematocrit 38.3 %      MCV 93.0 fL      MCH 28.6 pg      MCHC 30.8 g/dL      RDW 13.2 %      RDW-SD 45.1 fl      MPV 10.4 fL      Platelets 231 10*3/mm3      Neutrophil % 79.4 %      Lymphocyte % 12.4 %      Monocyte % 5.5 %      Eosinophil % 1.9 %      Basophil % 0.4 %      Immature Grans % 0.4 %      Neutrophils, Absolute 7.47 10*3/mm3      Lymphocytes, Absolute 1.17 10*3/mm3      Monocytes, Absolute 0.52 10*3/mm3      Eosinophils, Absolute 0.18 10*3/mm3      Basophils, Absolute 0.04 10*3/mm3      Immature Grans, Absolute 0.04 10*3/mm3      nRBC 0.0 /100 WBC     [place] Draw [[id number]] Collected: 10/24/24 1918    Specimen: Blood Updated: 10/24/24 1932    Narrative:      The following orders were created for panel order [place]  Draw.  Procedure                               Abnormality         Status                     ---------                               -----------         ------                     Green Top (Gel)[[id number]]                                  Final result               Lavender Top[[id number]]                                     Final result               Gold Top - SST[[id number]]                                   Final result               Light Blue Top[[id number]]                                   Final result                 Please view results for these tests on the individual orders.    Green Top (Gel) [[id number]] Collected: 10/24/24 1918    Specimen: Blood Updated: 10/24/24 1932     Extra Tube Hold for add-ons.     Comment: Auto resulted.       Lavender Top [[id number]] Collected: 10/24/24 1918    Specimen: Blood Updated: 10/24/24 1932     Extra Tube hold for add-on     Comment: Auto resulted       Gold Top - SST [[id number]] Collected: 10/24/24 1918    Specimen: Blood Updated: 10/24/24 1932     Extra Tube Hold for add-ons.     Comment: Auto resulted.       Light Blue Top [[id number]] Collected: 10/24/24 1918    Specimen: Blood Updated: 10/24/24 1932     Extra Tube Hold for add-ons.     Comment: Auto resulted               Imaging Results (Most Recent)       None          reviewed    ECG/EMG Results (most recent)       Procedure Component Value Units Date/Time    Telemetry Scan [[id number]] Resulted: 10/24/24     Updated: 10/24/24 2303          reviewed            Microbiology Results (last 10 days)       ** No results found for the last 240 hours. **            Assessment & Plan     Hypoglycemia        Diabetes mellitus with hypoglycemia  -Well controlled   Lab Results   Component Value Date    GLUCOSE 155 (H) 10/25/2024    GLUCOSE 161 (H) 10/24/2024    GLUCOSE 112 (H) 09/10/2024    GLUCOSE 151 (H) 02/27/2024   -Recent A1C 6.1  -Reports glucose as low as 37 at home  -On NovoLog 70/30 50 units twice daily at  home  -Glucose 241 upon arrival in the ED  -ED started Glucomander overnight and patient required 14 units of Lantus  -Hold Humalog 70/30 for now and DC Glucomander  -Lantus 15 units daily and SSI  -Diabetic diet  -Monitor before meals and at bedtime   -Diabetes educator consulted and recommended decreasing insulin from 50 to 40 twice daily. Check glucose and decrease insulin every 2 days by 2 units if still hypoglycemic.    Hypertension  -Well losartan controlled   BP Readings from Last 1 Encounters:   10/25/24 130/60   - Continue losartan and Bystolic  - Monitor while admitted     Hyperglycemia  -Continue with statin    Mood disorder  -Continue Wellbutrin    I discussed the patients findings and my recommendations with patient and nursing staff.     Discharge Diagnosis:      Hypoglycemia      Hospital Course  Patient is a 74 y.o. female presented with hypoglycemia as noted in HPI above.  Recent A1c 6.1 and patient was on Humalog 70/30 50 units twice daily.  She has been trying to lose weight in order to have knee surgery and noted blood sugar 37 twice in the past week.  She was admitted to the observation unit for diabetes educator consultation.  She remained stable and her glucose was 241 upon arrival, no hypoglycemia noted.  Diabetes educator recommended decreasing insulin from 50 to 40 units twice daily and checking glucose daily at home.  Decrease insulin every 2 days x 2 units if still hypoglycemic and follow-up with PCP for further recommendations. At this time, patient felt to be in good condition for discharge with close follow up with PCP. Instructed to take all medications as prescribed and to return to ED if any concerning signs/symptoms. All test/lab results were discussed with patient. All questions were answered and patient verbalizes understanding.   .      Past Medical History:     Past Medical History:   Diagnosis Date    Arthritis     Cataract 2009    Cholelithiasis ?    Colon polyp 2009     Depression     Mild     DM2 (diabetes mellitus, type 2)     High cholesterol     HTN (hypertension)     IDDM (insulin dependent diabetes mellitus)     Obesity        Past Surgical History:     Past Surgical History:   Procedure Laterality Date    BACK SURGERY      BREAST BIOPSY Left     core bx (unsure when)    BUNIONECTOMY      CATARACT EXTRACTION      CHOLECYSTECTOMY      COLONOSCOPY N/A 2020    Procedure: COLONOSCOPY WITH POLYPECTOMY X4;  Surgeon: Jani Montoya MD;  Location: [facility] ENDOSCOPY;  Service: Gastroenterology;  Laterality: N/A;  diverticulosis, colon polyps    FRACTURE SURGERY      ORIF ANKLE FRACTURE Left 2009       Social History:   Social History     Socioeconomic History    Marital status: Single   Tobacco Use    Smoking status: Former     Current packs/day: 0.00     Average packs/day: 0.5 packs/day for 27.6 years (13.8 ttl pk-yrs)     Types: Cigarettes     Start date: 1967     Quit date: 1995     Years since quittin.8    Smokeless tobacco: Never   Vaping Use    Vaping status: Never Used   Substance and Sexual Activity    Alcohol use: Yes     Comment: Once a month    Drug use: Never    Sexual activity: Not Currently       Procedures Performed         Consults:   Consults       No orders found for last 30 day(s).            Condition on Discharge:     Stable    Discharge Disposition  Home or Self Care    Discharge Medications     Discharge Medications        Changes to Medications        Instructions Start Date   NovoLOG Mix 70/30 FlexPen (70-30) 100 UNIT/ML suspension pen-injector injection  Generic drug: insulin aspart prot & aspart  What changed: See the new instructions.   40 Units, Subcutaneous, 2 Times Daily Before Meals, Check glucose every 2 days and decrease insulin by 2 units if still low             Continue These Medications        Instructions Start Date   aspirin 81 MG EC tablet   81 mg      Blood Pressure Digital Soln kit       buPROPion  MG 24  hr tablet  Commonly known as: WELLBUTRIN XL   TAKE 1 TABLET DAILY      fish oil-omega-3 fatty acids 1000 MG capsule   1 capsule, Daily      furosemide 20 MG tablet  Commonly known as: LASIX   20 mg, Oral, Daily      Glucosamine-Chondroit-Collagen 125-100-10 MG tablet   [facility] GLUCO-CHONDROIT PLUS UC--100-10 MG TABS      glucose blood test strip   One Touch Verio Test Strips- Use to check blood sugar three times a day. Dx: e11.65      Insulin Pen Needle 31G X 5 MM misc   COMFORT EZ PEN NEEDLES 31G X 5 MM      losartan 50 MG tablet  Commonly known as: COZAAR   50 mg, Oral, Daily      nebivolol 10 MG tablet  Commonly known as: BYSTOLIC   TAKE 1 TABLET DAILY      simvastatin 40 MG tablet  Commonly known as: ZOCOR   TAKE 1 TABLET DAILY      Turmeric 500 MG capsule       vitamin D 1.25 MG (55298 UT) capsule capsule  Commonly known as: ERGOCALCIFEROL   TAKE 1 CAPSULE WEEKLY               Discharge Diet:   Diet Instructions       Diet: Diabetic Diets; Consistent Carbohydrate; Regular (IDDSI 7); Thin (IDDSI 0)      Discharge Diet: Diabetic Diets    Diabetic Diet: Consistent Carbohydrate    Texture: Regular (IDDSI 7)    Fluid Consistency: Thin (IDDSI 0)            Activity at Discharge:     Follow-up Appointments  Future Appointments   Date Time Provider Department Center   3/14/2025 11:15 AM Karina Arvizu MD [facility]     Additional Instructions for the Follow-ups that You Need to Schedule       Discharge Follow-up with PCP   As directed       Currently Documented PCP:    Karina Arvizu MD    PCP Phone Number:    [phone]     Follow Up Details: 5-7 days                Test Results Pending at Discharge       Risk for Readmission (LACE) Score: 5 (10/25/2024  6:00 AM)      Greater than 30 minutes spent in discharge activities for this patient    Signature:Electronically signed by SHANTE Morales, 10/25/24, 11:10 AM EDT.

## 2024-10-25 NOTE — OUTREACH NOTE
Prep Survey      Flowsheet Row Responses   [facility]? [facility]   Is LACE score < 7 ? Yes   Eligibility [facility]   Date of Admission 10/24/24   Date of Discharge 10/25/24   Discharge Disposition Home or Self Care   Discharge diagnosis Hypoglycemia   Does the patient have one of the following disease processes/diagnoses(primary or secondary)? Other   Does the patient have Home health ordered? No   Is there a DME ordered? No   Prep survey completed? Yes            SARA LAW - Registered Nurse

## 2024-10-25 NOTE — PLAN OF CARE
Problem: Adult Inpatient Plan of Care  Goal: Plan of Care Review  Outcome: Progressing  Goal: Patient-Specific Goal (Individualized)  Outcome: Progressing  Goal: Absence of Hospital-Acquired Illness or Injury  Outcome: Progressing  Intervention: Identify and Manage Fall Risk  Recent Flowsheet Documentation  Taken 10/24/2024 2223 by Radha Ackerman RN  Safety Promotion/Fall Prevention: safety round/check completed  Taken 10/24/2024 2220 by Radha Ackerman RN  Safety Promotion/Fall Prevention: safety round/check completed  Intervention: Prevent Skin Injury  Recent Flowsheet Documentation  Taken 10/24/2024 2220 by Radha Ackerman RN  Body Position: position changed independently  Intervention: Prevent and Manage VTE (Venous Thromboembolism) Risk  Recent Flowsheet Documentation  Taken 10/24/2024 2220 by Radha Ackerman RN  VTE Prevention/Management:   compression stockings off   foot pump device off   SCDs (sequential compression devices) off  Goal: Optimal Comfort and Wellbeing  Outcome: Progressing  Intervention: Provide Person-Centered Care  Recent Flowsheet Documentation  Taken 10/24/2024 2220 by Radha Ackerman RN  Trust Relationship/Rapport:   care explained   choices provided   questions encouraged   questions answered   thoughts/feelings acknowledged  Goal: Readiness for Transition of Care  Outcome: Progressing  Intervention: Mutually Develop Transition Plan  Recent Flowsheet Documentation  Taken 10/24/2024 2146 by Radha Ackerman RN  Transportation Anticipated: family or friend will provide  Patient/Family Anticipated Services at Transition:   Patient/Family Anticipates Transition to: home with family  Taken 10/24/2024 2142 by Radha Ackerman RN  Equipment Currently Used at Home: bp cuff     Problem: Comorbidity Management  Goal: Maintenance of Asthma Control  Outcome: Progressing  Goal: Maintenance of Behavioral Health Symptom Control  Outcome:  Progressing  Goal: Maintenance of COPD Symptom Control  Outcome: Progressing  Goal: Blood Glucose Level Within Target Range  Outcome: Progressing  Goal: Maintenance of Heart Failure Symptom Control  Outcome: Progressing  Goal: Blood Pressure in Desired Range  Outcome: Progressing  Goal: Maintenance of Osteoarthritis Symptom Control  Outcome: Progressing  Intervention: Maintain Osteoarthritis Symptom Control  Recent Flowsheet Documentation  Taken 10/24/2024 2220 by Radha Ackerman, RN  Activity Management: up ad radha  Goal: Bariatric Home Regimen Maintained  Outcome: Progressing  Goal: Maintenance of Seizure Control  Outcome: Progressing   Goal Outcome Evaluation:

## 2024-10-25 NOTE — CASE MANAGEMENT/SOCIAL WORK
Discharge Planning Assessment   [facility]     Patient Name: Tanna Bull  MRN: [id number]  Today's Date: 10/25/2024    Admit Date: 10/24/2024    Plan: home   Discharge Needs Assessment       Row Name 10/25/24 1134       Living Environment    People in Home sibling(s);other (see comments)    Name(s) of People in Home sister and nephew in the home as well.    Current Living Arrangements home    Potentially Unsafe Housing Conditions none    In the past 12 months has the electric, gas, oil, or water company threatened to shut off services in your home? No    Primary Care Provided by self    Provides Primary Care For no one    Quality of Family Relationships helpful;involved;supportive    Able to Return to Prior Arrangements yes       Resource/Environmental Concerns    Resource/Environmental Concerns none    Transportation Concerns none       Transportation Needs    In the past 12 months, has lack of transportation kept you from medical appointments or from getting medications? no    In the past 12 months, has lack of transportation kept you from meetings, work, or from getting things needed for daily living? No       Food Insecurity    Within the past 12 months, you worried that your food would run out before you got the money to buy more. Never true    Within the past 12 months, the food you bought just didn't last and you didn't have money to get more. Never true       Transition Planning    Patient/Family Anticipates Transition to home with family    Patient/Family Anticipated Services at Transition none    Transportation Anticipated family or friend will provide       Discharge Needs Assessment    Readmission Within the Last 30 Days no previous admission in last 30 days    Equipment Currently Used at Home bp cuff;glucometer    Concerns to be Addressed denies needs/concerns at this time    Anticipated Changes Related to Illness none    Equipment Needed After Discharge none    Provided Post Acute Provider List? N/A                    Discharge Plan       Row Name 10/25/24 1134       Plan    Plan home    Plan Comments Maria delivered and signed.  met at bedside.  verifies she lives in home with her sister and nephew.  pt drives.  denies financial or transportation concerns.  verified pcp and pharmacy.  not current with an outside services at this time.                  Continued Care and Services - Admitted Since 10/24/2024    No active coordination exists for this encounter.          Demographic Summary       Row Name 10/25/24 1134       General Information    Admission Type observation    Arrived From emergency department    Required Notices Provided Observation Status Notice    Referral Source admission list    Reason for Consult discharge planning    Preferred Language English                   Functional Status       Row Name 10/25/24 1133       Functional Status    Usual Activity Tolerance good    Current Activity Tolerance good       Functional Status, IADL    Medications independent    Meal Preparation independent    Housekeeping independent    Laundry independent    Shopping independent       Mental Status    General Appearance WDL WDL       Mental Status Summary    Recent Changes in Mental Status/Cognitive Functioning no changes                     Patient Forms       Row Name 10/25/24 1130       Patient Forms    Important Message from Medicare (IMM) Delivered  Candace 10/25                      Libertad Lilly RN

## 2024-10-27 ENCOUNTER — TRANSITIONAL CARE MANAGEMENT TELEPHONE ENCOUNTER (OUTPATIENT)
Dept: CALL CENTER | Facility: HOSPITAL | Age: 74
End: 2024-10-27
Payer: COMMERCIAL

## 2024-10-27 NOTE — OUTREACH NOTE
Call Center TCM Note      Flowsheet Row Responses   [facility] patient discharged from? [facility]   Does the patient have one of the following disease processes/diagnoses(primary or secondary)? Other   TCM attempt successful? Yes  [VR-Melisa Grajeda, Sister]   Call start time 1352   Call end time 1355   Discharge diagnosis Hypoglycemia   Is patient permission given to speak with other caregiver? Yes   Meds reviewed with patient/caregiver? Yes   Is the patient having any side effects they believe may be caused by any medication additions or changes? No   Does the patient have all medications ordered at discharge? Yes   Is the patient taking all medications as directed (includes completed medication regime)? Yes   Does the patient have an appointment with their PCP within 7-14 days of discharge? No   Nursing Interventions Patient declined scheduling/rescheduling appointment at this time   Has home health visited the patient within 72 hours of discharge? N/A   Psychosocial issues? No   Did the patient receive a copy of their discharge instructions? Yes   Nursing interventions Reviewed instructions with patient   What is the patient's perception of their health status since discharge? Improving   Is the patient/caregiver able to teach back signs and symptoms related to disease process for when to call PCP? Yes   Is the patient/caregiver able to teach back signs and symptoms related to disease process for when to call 911? Yes   Is the patient/caregiver able to teach back the hierarchy of who to call/visit for symptoms/problems? PCP, Specialist, Home health nurse, Urgent Care, ED, 911 Yes   TCM call completed? Yes   Wrap up additional comments Patient doing well, no concerns.   Call end time 1355   Would this patient benefit from a Referral to Amb Social Work? No   Is the patient interested in additional calls from an ambulatory ? No            Jo Barajas RN    10/27/2024, 13:55 EDT

## 2024-10-28 NOTE — CASE MANAGEMENT/SOCIAL WORK
Case Management Discharge Note      Final Note: Home    Provided Post Acute Provider List?: N/A  Transportation Services  Private: Car    Final Discharge Disposition Code: 01 - home or self-care

## 2024-11-06 ENCOUNTER — OFFICE VISIT (OUTPATIENT)
Dept: FAMILY MEDICINE CLINIC | Facility: CLINIC | Age: 74
End: 2024-11-06
Payer: COMMERCIAL

## 2024-11-06 VITALS
OXYGEN SATURATION: 97 % | HEART RATE: 68 BPM | WEIGHT: 246 LBS | SYSTOLIC BLOOD PRESSURE: 183 MMHG | HEIGHT: 64 IN | TEMPERATURE: 98 F | DIASTOLIC BLOOD PRESSURE: 73 MMHG | BODY MASS INDEX: 42 KG/M2

## 2024-11-06 DIAGNOSIS — I12.9 BENIGN HYPERTENSION WITH CHRONIC KIDNEY DISEASE: ICD-10-CM

## 2024-11-06 DIAGNOSIS — E78.2 MIXED HYPERLIPIDEMIA: ICD-10-CM

## 2024-11-06 DIAGNOSIS — E16.2 HYPOGLYCEMIA: ICD-10-CM

## 2024-11-06 DIAGNOSIS — R07.81 RIB PAIN: ICD-10-CM

## 2024-11-06 DIAGNOSIS — E11.22 TYPE 2 DIABETES MELLITUS WITH CHRONIC KIDNEY DISEASE, WITH LONG-TERM CURRENT USE OF INSULIN, UNSPECIFIED CKD STAGE: Primary | ICD-10-CM

## 2024-11-06 DIAGNOSIS — Z79.4 TYPE 2 DIABETES MELLITUS WITH CHRONIC KIDNEY DISEASE, WITH LONG-TERM CURRENT USE OF INSULIN, UNSPECIFIED CKD STAGE: Primary | ICD-10-CM

## 2024-11-06 PROCEDURE — 99495 TRANSJ CARE MGMT MOD F2F 14D: CPT | Performed by: FAMILY MEDICINE

## 2024-11-06 RX ORDER — AMANTADINE HYDROCHLORIDE 100 MG/1
TABLET ORAL
COMMUNITY
Start: 2024-10-03

## 2024-11-06 RX ORDER — ACYCLOVIR 400 MG/1
1 TABLET ORAL
Qty: 6 EACH | Refills: 3 | Status: SHIPPED | OUTPATIENT
Start: 2024-11-06

## 2024-11-06 RX ORDER — HYDROCODONE BITARTRATE AND ACETAMINOPHEN 5; 325 MG/1; MG/1
1 TABLET ORAL EVERY 6 HOURS PRN
Qty: 10 TABLET | Refills: 0 | Status: SHIPPED | OUTPATIENT
Start: 2024-11-06

## 2024-11-06 NOTE — PROGRESS NOTES
"Transitional Care Follow Up Visit  Subjective     Tanna Bull is a 74 y.o. female who presents for a transitional care management visit.    Within 48 business hours after discharge our office contacted her via telephone to coordinate her care and needs.      I reviewed and discussed the details of that call along with the discharge summary, hospital problems, inpatient lab results, inpatient diagnostic studies, and consultation reports with Tanna.     Current outpatient and discharge medications have been reconciled for the patient.  Reviewed by: Karina Arvizu MD          10/25/2024     5:12 PM   Date of TCM Phone Call   [facility]   Date of Admission 10/24/2024   Date of Discharge 10/25/2024   Discharge Disposition Home or Self Care     Risk for Readmission (LACE) Score: 5 (10/25/2024  6:00 AM)      History of Present Illness   Course During Hospital Stay:  \"Patient is a 74-year-old female who arrives today with reports from family that she was unresponsive when they got home.  Has history of diabetes and had a similar episode on Tuesday of this week however they are able to get her blood sugar up with oral Snapple beverage.  EMS reports that blood sugar was 37 upon their arrival.  She was given D50 as well as started on a D10 drip.  Patient reports that she has had diabetes for many years she takes 50 units of NovoLog 70-30 every morning between 9 and 915.  She does not check her blood sugar.  States that in September her A1c was 6.1.  Patient is requesting some diabetes education as she was not aware that sugars needed to be checked regularly and feels that 50 units of insulin might be too much for her.  States that she asked her primary care provider about this in September when her A1c was 6.1 and as they manage her medications no changes were made.     Observation 10/25/2024  Patient agrees with HPI noted above.  She reports that she has been on current dose of insulin NovoLog 70/30 15 " Visit Information Date & Time Provider Department Dept. Phone Encounter #  
 5/18/2017  1:00 PM [id number] [place] [phone] [id number] Your Appointments 6/19/2017 10:30 AM  
NUTRITION COUNSELING with FIOR NUTR VISIT2 [facility] (Author Sharon) Appt Note: 2 of 6; 1:1  
 [address] [place] [address]  
[phone]  
  
   
 [address] [place] [address]  
  
    
 6/19/2017 11:00 AM  
New Patient with MD [facility] Author Sharon) Appt Note: 2 of 6; 1:1  
 [address] [facility] [place]  
  
   
 [address] [place] [address] Upcoming Health Maintenance Date Due DTaP/Tdap/Td series (1 - Tdap) 7/28/1997 PAP AKA CERVICAL CYTOLOGY 7/28/1997 INFLUENZA AGE 9 TO ADULT 8/1/2017 Allergies as of 5/18/2017  Review Complete On: 11/3/2014 By: Aaron Lawrence MD  
  
 Severity Noted Reaction Type Reactions Bactrim [Sulfamethoprim Ds]  10/30/2014    Rash Current Immunizations  Never Reviewed No immunizations on file. Not reviewed this visit Vitals Height(growth percentile) Weight(growth percentile) BMI OB Status Smoking Status 5' 9\" (1.753 m) 266 lb (120.7 kg) 39.28 kg/m2 Unknown Former Smoker BMI and BSA Data Body Mass Index Body Surface Area  
 39.28 kg/m 2 2.42 m 2 Your Updated Medication List  
  
   
This list is accurate as of: 5/18/17  1:12 PM.  Always use your most recent med list.  
  
  
  
  
 Juliana Beady Take  by mouth.  
  
 levothyroxine 75 mcg tablet Commonly known as:  SYNTHROID Take  by mouth Daily (before breakfast). metFORMIN 500 mg tablet Commonly known as:  GLUCOPHAGE Take  by mouth two (2) times daily (with meals). "units twice daily for many years and that when she started on that dose her A1c was higher.  However now A1c 6.1 and she has noted hypoglycemia at times including blood sugar mid 30s on Wednesday and Thursday in the afternoon. She has been trying to lose weight per ortho's recommendations as she needs to have knee surgery.  She reports feeling \" strange\" at that time but is currently asymptomatic and eager for discharge later. Diabetes educator consulted.\"    BS running 100-115  No further hypoglycemia  Wants a CGM so she is less likely to have another episode of hypoglycemia  Checking BS at least twice a day now  Being consistent with insulin and the changes in dose  She is still trying to increase activity and improve diet so she can have the joint surgery ortho has recommended  Exercises at a gym the [employer] allows prev employees to use  Relates that she injured a rib on the right and has been taking 1/2 pill of some norco she had  Pain is improving       The following portions of the patient's history were reviewed and updated as appropriate: allergies, current medications, past family history, past medical history, past social history, past surgical history, and problem list.    Review of Systems   Constitutional: Negative.    Respiratory: Negative.     Cardiovascular:  Positive for chest pain (post right rib). Negative for palpitations and leg swelling.   Endocrine: Negative.    Musculoskeletal:  Positive for arthralgias.       Objective   BP (!) 183/73 (BP Location: Left arm, Patient Position: Sitting, Cuff Size: Large Adult)   Pulse 68   Temp 98 °F (36.7 °C) (Temporal)   Ht 162.6 cm (64\")   Wt 112 kg (246 lb)   SpO2 97%   BMI 42.23 kg/m²   Physical Exam  Vitals and nursing note reviewed.   Constitutional:       Appearance: Normal appearance. She is morbidly obese.   Cardiovascular:      Rate and Rhythm: Normal rate and regular rhythm.      Heart sounds: Normal heart sounds.   Pulmonary:      " Patient Instructions Goals: 1. Start taking one [place] complete daily. 2. Go to gym at least 3 times per week- for 30-45 minutes. 3. Increase eating frequency from 1 time per day to 3. Can use a protein shake for one meal.   
4. Work on decreasing the sugared beverages- try some diet products. Introducing [facility] SERVICES! [employer] introduces [employer] patient portal. Now you can access parts of your medical record, email your doctor's office, and request medication refills online. 1. In your internet browser, go to [email or web address] 2. Click on the First Time User? Click Here link in the Sign In box. You will see the New Member Sign Up page. 3. Enter your [employer] Access Code exactly as it appears below. You will not need to use this code after youve completed the sign-up process. If you do not sign up before the expiration date, you must request a new code. · [employer] Access Code: [email or web address] Expires: 8/16/2017  1:12 PM 
 
4. Enter the last four digits of your Social Security Number (xxxx) and Date of Birth (mm/dd/yyyy) as indicated and click Submit. You will be taken to the next sign-up page. 5. Create a [employer]qua ID. This will be your [employer] login ID and cannot be changed, so think of one that is secure and easy to remember. 6. Create a [employer] password. You can change your password at any time. 7. Enter your Password Reset Question and Answer. This can be used at a later time if you forget your password. 8. Enter your e-mail address. You will receive e-mail notification when new information is available in [address] 9. Click Sign Up. You can now view and download portions of your medical record. 10. Click the Download Summary menu link to download a portable copy of your medical information. If you have questions, please visit the Frequently Asked Questions section of the [employer] website.  Remember, [employer] is NOT to be used for urgent Effort: Pulmonary effort is normal.      Breath sounds: Normal breath sounds.   Chest:      Chest wall: Tenderness (post upper rib cage on right/no palp fx) present.   Neurological:      Mental Status: She is alert.   Psychiatric:         Behavior: Behavior is cooperative.       Lab Results   Component Value Date    GLUCOSE 155 (H) 10/25/2024    BUN 30 (H) 10/25/2024    CREATININE 1.41 (H) 10/25/2024     10/25/2024    K 4.2 10/25/2024     10/25/2024    CALCIUM 9.2 10/25/2024    PROTEINTOT 7.3 10/24/2024    ALBUMIN 4.2 10/24/2024    ALT 12 10/24/2024    AST 18 10/24/2024    ALKPHOS 114 10/24/2024    BILITOT 0.2 10/24/2024    GLOB 3.1 10/24/2024    AGRATIO 1.4 10/24/2024    BCR 21.3 10/25/2024    ANIONGAP 7.7 10/25/2024    EGFR 38.7 (L) 10/25/2024     Lab Results   Component Value Date    WBC 8.36 10/25/2024    HGB 11.0 (L) 10/25/2024    HCT 34.0 10/25/2024    MCV 92.9 10/25/2024     10/25/2024     Lab Results   Component Value Date    HGBA1C 6.1 (H) 09/10/2024         Assessment & Plan   Problems Addressed this Visit          Cardiac and Vasculature    Mixed hyperlipidemia    Relevant Orders    Comprehensive Metabolic Panel    Lipid Panel    Primary hypertension    Benign hypertension with chronic kidney disease    Relevant Orders    Comprehensive Metabolic Panel    Lipid Panel       Endocrine and Metabolic    Type 2 diabetes mellitus with diabetic chronic kidney disease - Primary    Relevant Orders    Comprehensive Metabolic Panel    Lipid Panel    Hemoglobin A1c    Hypoglycemia     Other Visit Diagnoses       Rib pain        Relevant Medications    HYDROcodone-acetaminophen (Norco) 5-325 MG per tablet          Diagnoses         Codes Comments    Type 2 diabetes mellitus with chronic kidney disease, with long-term current use of insulin, unspecified CKD stage    -  Primary ICD-10-CM: E11.22, Z79.4  ICD-9-CM: 250.40, 585.9, V58.67     Hypoglycemia     ICD-10-CM: E16.2  ICD-9-CM: 251.2     Rib pain   needs. For medical emergencies, dial 911. Now available from your iPhone and Android! Please provide this summary of care documentation to your next provider. Your primary care clinician is listed as Gilberto Cooper. If you have any questions after today's visit, please call [phone].    ICD-10-CM: R07.81  ICD-9-CM: 786.50     Benign hypertension with chronic kidney disease     ICD-10-CM: I12.9  ICD-9-CM: 403.10, 585.9     Mixed hyperlipidemia     ICD-10-CM: E78.2  ICD-9-CM: 272.2     Primary hypertension     ICD-10-CM: I10  ICD-9-CM: 401.9             Overall she has improved.  She will continue to monitor her blood sugars and try to improve her diet.  She has made the adjustments in the insulin dose and blood sugars are staying much better controlled.  I will write her prescription for a continuous glucose monitor, Dexcom 7.  She has a smart phone at home that she will use along with it.  I feel that this would be extremely beneficial to her with her recent history of repeated episodes of hypoglycemia that required hospitalization.  She is on insulin.  I have put the orders in for her follow-up labs that she will need prior to her next appointment regarding her hypertension, hyperlipidemia, diabetes.  I gave her 10 pills of Norco for the rib pain that she has.  She will use it on a limited basis.  She will continue to increase her physical activity slowly.  She was encouraged to be compliant with her diabetic diet.  She was encouraged to increase her protein slightly as well as her water intake since she says nephrology has not given her any water restrictions.  She has a follow-up appointment scheduled

## 2024-11-06 NOTE — PATIENT INSTRUCTIONS
Keep working to lose weight through healthy eating and exercise.   No fried foods and limit pasta, bread, and sweets.  Focus on protein  Check your blood sugar twice a day  Drink more water

## 2024-12-23 RX ORDER — ERGOCALCIFEROL 1.25 MG/1
CAPSULE, LIQUID FILLED ORAL
Qty: 12 CAPSULE | Refills: 1 | Status: SHIPPED | OUTPATIENT
Start: 2024-12-23

## 2025-01-13 RX ORDER — NEBIVOLOL 10 MG/1
TABLET ORAL
Qty: 90 TABLET | Refills: 1 | Status: SHIPPED | OUTPATIENT
Start: 2025-01-13

## 2025-01-13 RX ORDER — SIMVASTATIN 40 MG
TABLET ORAL
Qty: 90 TABLET | Refills: 2 | Status: SHIPPED | OUTPATIENT
Start: 2025-01-13

## 2025-02-02 RX ORDER — BUPROPION HYDROCHLORIDE 300 MG/1
TABLET ORAL
Qty: 90 TABLET | Refills: 1 | Status: SHIPPED | OUTPATIENT
Start: 2025-02-02

## 2025-03-08 LAB
ALBUMIN SERPL-MCNC: 4.2 G/DL (ref 3.8–4.8)
ALP SERPL-CCNC: 114 IU/L (ref 44–121)
ALT SERPL-CCNC: 10 IU/L (ref 0–32)
AMBIG ABBREV CMP14 DEFAULT: NORMAL
AMBIG ABBREV LP DEFAULT: NORMAL
AST SERPL-CCNC: 13 IU/L (ref 0–40)
BILIRUB SERPL-MCNC: 0.5 MG/DL (ref 0–1.2)
BUN SERPL-MCNC: 33 MG/DL (ref 8–27)
BUN/CREAT SERPL: 21 (ref 12–28)
CALCIUM SERPL-MCNC: 9.5 MG/DL (ref 8.7–10.3)
CHLORIDE SERPL-SCNC: 104 MMOL/L (ref 96–106)
CHOLEST SERPL-MCNC: 140 MG/DL (ref 100–199)
CO2 SERPL-SCNC: 23 MMOL/L (ref 20–29)
CREAT SERPL-MCNC: 1.55 MG/DL (ref 0.57–1)
EGFRCR SERPLBLD CKD-EPI 2021: 35 ML/MIN/1.73
GLOBULIN SER CALC-MCNC: 2.5 G/DL (ref 1.5–4.5)
GLUCOSE SERPL-MCNC: 138 MG/DL (ref 70–99)
HBA1C MFR BLD: 6.1 % (ref 4.8–5.6)
HDLC SERPL-MCNC: 40 MG/DL
LDLC SERPL CALC-MCNC: 77 MG/DL (ref 0–99)
POTASSIUM SERPL-SCNC: 5.1 MMOL/L (ref 3.5–5.2)
PROT SERPL-MCNC: 6.7 G/DL (ref 6–8.5)
SODIUM SERPL-SCNC: 140 MMOL/L (ref 134–144)
TRIGL SERPL-MCNC: 128 MG/DL (ref 0–149)
VLDLC SERPL CALC-MCNC: 23 MG/DL (ref 5–40)

## 2025-03-14 ENCOUNTER — OFFICE VISIT (OUTPATIENT)
Dept: FAMILY MEDICINE CLINIC | Facility: CLINIC | Age: 75
End: 2025-03-14
Payer: COMMERCIAL

## 2025-03-14 VITALS
WEIGHT: 254 LBS | HEART RATE: 66 BPM | SYSTOLIC BLOOD PRESSURE: 126 MMHG | BODY MASS INDEX: 43.36 KG/M2 | OXYGEN SATURATION: 95 % | DIASTOLIC BLOOD PRESSURE: 84 MMHG | HEIGHT: 64 IN | TEMPERATURE: 97.9 F

## 2025-03-14 DIAGNOSIS — Z79.4 TYPE 2 DIABETES MELLITUS WITH CHRONIC KIDNEY DISEASE, WITH LONG-TERM CURRENT USE OF INSULIN, UNSPECIFIED CKD STAGE: ICD-10-CM

## 2025-03-14 DIAGNOSIS — E11.22 TYPE 2 DIABETES MELLITUS WITH CHRONIC KIDNEY DISEASE, WITH LONG-TERM CURRENT USE OF INSULIN, UNSPECIFIED CKD STAGE: ICD-10-CM

## 2025-03-14 DIAGNOSIS — R53.83 FATIGUE, UNSPECIFIED TYPE: ICD-10-CM

## 2025-03-14 DIAGNOSIS — I10 PRIMARY HYPERTENSION: ICD-10-CM

## 2025-03-14 DIAGNOSIS — E78.2 MIXED HYPERLIPIDEMIA: Primary | ICD-10-CM

## 2025-03-14 PROCEDURE — 99213 OFFICE O/P EST LOW 20 MIN: CPT | Performed by: FAMILY MEDICINE

## 2025-03-14 NOTE — PROGRESS NOTES
Subjective   Tanna Bull is a 74 y.o. female.     History of Present Illness  The patient presents for a follow-up on blood pressure, cholesterol, and diabetes.    She reports a fluctuation in weight, with a gain of 6 pounds. She experiences fatigue, particularly towards the end of the day, which she attributes to her age. Her physical activity is limited to gym sessions three times a week. She also reports nocturia, necessitating two bathroom visits per night, which disrupts her sleep. However, she notes that uninterrupted sleep results in her feeling rejuvenated upon waking.    Her Dexcom device indicates a 2-week average blood glucose level of 126. She is not experiencing any chest pain or respiratory distress.       The following portions of the patient's history were reviewed and updated as appropriate: allergies, current medications, past family history, past medical history, past social history, past surgical history, and problem list.  Past Medical History:   Diagnosis Date    Arthritis     Cataract 2009    Cholelithiasis ?    Colon polyp 2009    Depression     Mild     DM2 (diabetes mellitus, type 2)     Fracture of ankle 2008    High cholesterol     HTN (hypertension)     IDDM (insulin dependent diabetes mellitus)     Knee swelling     Lumbosacral disc disease 1989/1991    Obesity      Past Surgical History:   Procedure Laterality Date    ANKLE OPEN REDUCTION INTERNAL FIXATION  2008    BACK SURGERY      BREAST BIOPSY Left     core bx (unsure when)    BUNIONECTOMY      CATARACT EXTRACTION      CHOLECYSTECTOMY      COLONOSCOPY N/A 09/01/2020    Procedure: COLONOSCOPY WITH POLYPECTOMY X4;  Surgeon: Jani Montoya MD;  Location: [facility] ENDOSCOPY;  Service: Gastroenterology;  Laterality: N/A;  diverticulosis, colon polyps    FRACTURE SURGERY  2008    ORIF ANKLE FRACTURE Left 2009     Family History   Problem Relation Age of Onset    Diabetes Other     Stroke Other     Breast cancer Other     Breast  cancer Sister 60    Asthma Sister     Cancer Sister     Diabetes Maternal Aunt     Diabetes Maternal Aunt      Social History     Socioeconomic History    Marital status: Single   Tobacco Use    Smoking status: Former     Current packs/day: 0.00     Average packs/day: 0.5 packs/day for 27.6 years (13.8 ttl pk-yrs)     Types: Cigarettes     Start date: 1967     Quit date: 1995     Years since quittin.2    Smokeless tobacco: Never   Vaping Use    Vaping status: Never Used   Substance and Sexual Activity    Alcohol use: Yes     Comment: Once a month    Drug use: Never    Sexual activity: Not Currently         Current Outpatient Medications:     aspirin 81 MG EC tablet, Take 1 tablet by mouth., Disp: , Rfl:     Blood Pressure Monitoring (Blood Pressure Digital Soln) kit, , Disp: , Rfl:     buPROPion XL (WELLBUTRIN XL) 300 MG 24 hr tablet, TAKE 1 TABLET DAILY, Disp: 90 tablet, Rfl: 1    Continuous Glucose Sensor (Dexcom G7 Sensor) misc, Use 1 Device Every 14 (Fourteen) Days., Disp: 6 each, Rfl: 3    fish oil-omega-3 fatty acids 1000 MG capsule, Take 1 capsule by mouth Daily., Disp: , Rfl:     furosemide (LASIX) 20 MG tablet, TAKE 1 TABLET DAILY, Disp: 90 tablet, Rfl: 1    Glucosamine-Chondroit-Collagen 125-100-10 MG tablet, [facility] GLUCO-CHONDROIT PLUS UC--100-10 MG TABS, Disp: , Rfl:     glucose blood test strip, One Touch Verio Test Strips- Use to check blood sugar three times a day. Dx: e11.65, Disp: 300 each, Rfl: 3    HYDROcodone-acetaminophen (Norco) 5-325 MG per tablet, Take 1 tablet by mouth Every 6 (Six) Hours As Needed for Moderate Pain., Disp: 10 tablet, Rfl: 0    insulin aspart prot & aspart (NovoLOG Mix 70/30 FlexPen) (70-30) 100 UNIT/ML suspension pen-injector injection, Inject 0.4 mL under the skin into the appropriate area as directed 2 (Two) Times a Day Before Meals. Check glucose every 2 days and decrease insulin by 2 units if still low, Disp: , Rfl:     Insulin Pen Needle 31G X 5 MM  "misc, COMFORT EZ PEN NEEDLES 31G X 5 MM, Disp: , Rfl:     losartan (COZAAR) 50 MG tablet, Take 1 tablet by mouth Daily., Disp: , Rfl:     nebivolol (BYSTOLIC) 10 MG tablet, TAKE 1 TABLET DAILY, Disp: 90 tablet, Rfl: 1    simvastatin (ZOCOR) 40 MG tablet, TAKE 1 TABLET DAILY, Disp: 90 tablet, Rfl: 2    Turmeric 500 MG capsule, , Disp: , Rfl:     vitamin D (ERGOCALCIFEROL) 1.25 MG (27082 UT) capsule capsule, TAKE 1 CAPSULE WEEKLY, Disp: 12 capsule, Rfl: 1    amantadine (SYMMETREL) 100 MG tablet, cream, Disp: , Rfl:     Review of Systems  ROS done and noted in HPI    /84   Pulse 66   Temp 97.9 °F (36.6 °C) (Temporal)   Ht 162.6 cm (64\")   Wt 115 kg (254 lb)   SpO2 95%   BMI 43.60 kg/m²   Class 3 Severe Obesity (BMI >=40). Obesity-related health conditions include the following: diabetes mellitus. Obesity is worsening. BMI is is above average; BMI management plan is completed. We discussed portion control and increasing exercise.       Objective   Physical Exam  Vitals and nursing note reviewed.   Constitutional:       Appearance: Normal appearance. She is morbidly obese.   Cardiovascular:      Rate and Rhythm: Normal rate and regular rhythm.      Heart sounds: Normal heart sounds.   Pulmonary:      Effort: Pulmonary effort is normal.      Breath sounds: Normal breath sounds.   Musculoskeletal:      Right lower leg: No edema.      Left lower leg: No edema.   Neurological:      Mental Status: She is alert.   Psychiatric:         Mood and Affect: Mood normal.       Physical Exam  Lungs are clear.       Results  Lab Results   Component Value Date    GLUCOSE 138 (H) 03/07/2025    BUN 33 (H) 03/07/2025    CREATININE 1.55 (H) 03/07/2025     03/07/2025    K 5.1 03/07/2025     03/07/2025    CALCIUM 9.5 03/07/2025    PROTEINTOT 6.7 03/07/2025    ALBUMIN 4.2 03/07/2025    ALT 10 03/07/2025    AST 13 03/07/2025    ALKPHOS 114 03/07/2025    BILITOT 0.5 03/07/2025    GLOB 2.5 03/07/2025    AGRATIO 1.4 " 10/24/2024    BCR 21 03/07/2025    ANIONGAP 7.7 10/25/2024    EGFR 35 (L) 03/07/2025     Lab Results   Component Value Date    CHLPL 140 03/07/2025    TRIG 128 03/07/2025    HDL 40 03/07/2025    LDL 77 03/07/2025     Lab Results   Component Value Date    HGBA1C 6.1 (H) 03/07/2025           Laboratory Studies  Kidney function was about the same. A1c was normal. Cholesterol panel was normal. Liver tests were normal. Hemoglobin was 12.2, hematocrit was 36.5. Vitamin D level was 53.6.       Assessment & Plan   Problems Addressed this Visit          Cardiac and Vasculature    Mixed hyperlipidemia - Primary    Relevant Orders    Comprehensive Metabolic Panel    Lipid Panel    Primary hypertension    Relevant Orders    Comprehensive Metabolic Panel    Lipid Panel       Endocrine and Metabolic    Type 2 diabetes mellitus with diabetic chronic kidney disease    Relevant Orders    Comprehensive Metabolic Panel    Lipid Panel    TSH    Hemoglobin A1c     Other Visit Diagnoses         Fatigue, unspecified type        Relevant Orders    TSH          Diagnoses         Codes Comments      Mixed hyperlipidemia    -  Primary ICD-10-CM: E78.2  ICD-9-CM: 272.2       Primary hypertension     ICD-10-CM: I10  ICD-9-CM: 401.9       Type 2 diabetes mellitus with chronic kidney disease, with long-term current use of insulin, unspecified CKD stage     ICD-10-CM: E11.22, Z79.4  ICD-9-CM: 250.40, 585.9, V58.67       Fatigue, unspecified type     ICD-10-CM: R53.83  ICD-9-CM: 780.79           Assessment & Plan  1. Hypertension.  Blood pressure readings have been stable, with a reading of 138 on the day of the lab draw. No changes to the current medication regimen are necessary at this time.    2. Hypercholesterolemia.  Cholesterol panel results were excellent. No changes to the current medication regimen are necessary at this time.    3. Diabetes Mellitus.  A1c levels were well-controlled, and the 2-week average glucose level from the Dexcom  device was 126. No changes to the current medication regimen are necessary at this time.    4. Fatigue.  The patient reports feeling tired, especially by the end of the day. Hemoglobin and hematocrit levels from March 7, 2025, indicate no anemia. Vitamin D levels are within the normal range. A thyroid function test will be conducted during the next blood draw to rule out thyroid issues as a cause of fatigue.     Labs were all reviewed with patient.  I went ahead and ordered the labs that she will do in 6 months.  She was counseled on the need for weight loss through improved diet and exercise       Patient or patient representative verbalized consent for the use of Ambient Listening during the visit with  Karina Arvizu MD for chart documentation. 3/14/2025  11:43 EDT

## 2025-04-11 RX ORDER — INSULIN ASPART 100 [IU]/ML
INJECTION, SUSPENSION SUBCUTANEOUS
Qty: 90 ML | Refills: 1 | Status: SHIPPED | OUTPATIENT
Start: 2025-04-11

## 2025-06-08 RX ORDER — ERGOCALCIFEROL 1.25 MG/1
50000 CAPSULE, LIQUID FILLED ORAL WEEKLY
Qty: 12 CAPSULE | Refills: 1 | Status: SHIPPED | OUTPATIENT
Start: 2025-06-08

## 2025-08-10 RX ORDER — BUPROPION HYDROCHLORIDE 300 MG/1
300 TABLET ORAL DAILY
Qty: 90 TABLET | Refills: 1 | Status: SHIPPED | OUTPATIENT
Start: 2025-08-10

## 2025-08-31 RX ORDER — FUROSEMIDE 20 MG/1
20 TABLET ORAL DAILY
Qty: 90 TABLET | Refills: 1 | Status: SHIPPED | OUTPATIENT
Start: 2025-08-31

## (undated) DEVICE — PAPR PRNT PK SONY W RIBN UPC55

## (undated) DEVICE — TRAP WIDEEYE POLYP

## (undated) DEVICE — PK ENDO GI 50

## (undated) DEVICE — SINGLE-USE BIOPSY FORCEPS: Brand: RADIAL JAW 4

## (undated) DEVICE — SNAR POLYP CAPTIVATOR2 RND STFF 2.4 25MM 240CM